# Patient Record
Sex: FEMALE | Race: WHITE | NOT HISPANIC OR LATINO | Employment: FULL TIME | URBAN - METROPOLITAN AREA
[De-identification: names, ages, dates, MRNs, and addresses within clinical notes are randomized per-mention and may not be internally consistent; named-entity substitution may affect disease eponyms.]

---

## 2017-01-21 ENCOUNTER — HOSPITAL ENCOUNTER (OUTPATIENT)
Dept: MAMMOGRAPHY | Facility: CLINIC | Age: 52
Discharge: HOME | End: 2017-01-21
Payer: MEDICARE

## 2017-01-21 DIAGNOSIS — Z12.31 ENCOUNTER FOR SCREENING MAMMOGRAM FOR MALIGNANT NEOPLASM OF BREAST: ICD-10-CM

## 2017-01-21 PROCEDURE — 77063 BREAST TOMOSYNTHESIS BI: CPT

## 2017-01-21 PROCEDURE — G0202 SCR MAMMO BI INCL CAD: HCPCS

## 2017-02-28 ENCOUNTER — LAB CONVERSION - ENCOUNTER (OUTPATIENT)
Dept: OTHER | Facility: OTHER | Age: 52
End: 2017-02-28

## 2017-02-28 ENCOUNTER — GENERIC CONVERSION - ENCOUNTER (OUTPATIENT)
Dept: OTHER | Facility: OTHER | Age: 52
End: 2017-02-28

## 2017-02-28 LAB
DEPRECATED RDW RBC AUTO: 13.2 % (ref 11–15)
HCT VFR BLD AUTO: 35.6 % (ref 35–45)
HGB BLD-MCNC: 11.7 G/DL (ref 11.7–15.5)
MCH RBC QN AUTO: 29.9 PG (ref 27–33)
MCHC RBC AUTO-ENTMCNC: 32.8 G/DL (ref 32–36)
MCV RBC AUTO: 91.1 FL (ref 80–100)
PLATELET # BLD AUTO: 222 THOUSAND/UL (ref 140–400)
PMV BLD AUTO: 9.4 FL (ref 7.5–12.5)
RBC # BLD AUTO: 3.91 MILLION/UL (ref 3.8–5.1)
TSH SERPL DL<=0.05 MIU/L-ACNC: 4.33 MIU/L
WBC # BLD AUTO: 6.2 THOUSAND/UL (ref 3.8–10.8)

## 2017-05-30 ENCOUNTER — ALLSCRIPTS OFFICE VISIT (OUTPATIENT)
Dept: OTHER | Facility: OTHER | Age: 52
End: 2017-05-30

## 2017-12-15 ENCOUNTER — GENERIC CONVERSION - ENCOUNTER (OUTPATIENT)
Dept: OTHER | Facility: OTHER | Age: 52
End: 2017-12-15

## 2017-12-18 ENCOUNTER — TRANSCRIBE ORDERS (OUTPATIENT)
Dept: ADMINISTRATIVE | Facility: HOSPITAL | Age: 52
End: 2017-12-18

## 2017-12-18 DIAGNOSIS — Z13.820 ENCOUNTER FOR SCREENING FOR OSTEOPOROSIS: Primary | ICD-10-CM

## 2017-12-29 ENCOUNTER — HOSPITAL ENCOUNTER (OUTPATIENT)
Dept: BONE DENSITY | Facility: IMAGING CENTER | Age: 52
Discharge: HOME | End: 2017-12-29
Payer: MEDICARE

## 2017-12-29 ENCOUNTER — GENERIC CONVERSION - ENCOUNTER (OUTPATIENT)
Dept: FAMILY MEDICINE CLINIC | Facility: HOSPITAL | Age: 52
End: 2017-12-29

## 2017-12-29 DIAGNOSIS — Z13.820 ENCOUNTER FOR SCREENING FOR OSTEOPOROSIS: ICD-10-CM

## 2017-12-29 PROCEDURE — 77080 DXA BONE DENSITY AXIAL: CPT

## 2018-01-02 ENCOUNTER — GENERIC CONVERSION - ENCOUNTER (OUTPATIENT)
Dept: OTHER | Facility: OTHER | Age: 53
End: 2018-01-02

## 2018-01-12 NOTE — RESULT NOTES
Verified Results  (1) LIPID PANEL, FASTING [id number] 02:03PM [facility]     Test Name Result Flag Reference   CHOLESTEROL, TOTAL 224 mg/dL H 125-200   HDL CHOLESTEROL 51 mg/dL  > OR = 46   TRIGLICERIDES 874 mg/dL  <150   LDL-CHOLESTEROL 151 mg/dL (calc) H <130   Desirable range <100 mg/dL for patients with CHD or  diabetes and <70 mg/dL for diabetic patients with  known heart disease  CHOL/HDLC RATIO 4 4 (calc)  < OR = 5 0   NON HDL CHOLESTEROL 173 mg/dL (calc) H    Target for non-HDL cholesterol is 30 mg/dL higher than   LDL cholesterol target  (1) COMPREHENSIVE METABOLIC PANEL [id number] 80:76OV [facility]     Test Name Result Flag Reference   GLUCOSE 89 mg/dL  65-99   Fasting reference interval   UREA NITROGEN (BUN) 19 mg/dL  7-25   CREATININE 1 03 mg/dL  0 50-1 10   eGFR NON-AFR   AMERICAN 64 mL/min/1 73m2  > OR = 60   eGFR AFRICAN AMERICAN 74 mL/min/1 73m2  > OR = 60   BUN/CREATININE RATIO   8-75   NOT APPLICABLE (calc)   ALT 8 U/L  6-29   ALBUMIN 4 2 g/dL  3 6-5 1   GLOBULIN 2 8 g/dL (calc)  1 9-3 7   ALBUMIN/GLOBULIN RATIO 1 5 (calc)  1 0-2 5   BILIRUBIN, TOTAL 0 3 mg/dL  0 2-1 2   ALKALINE PHOSPHATASE 70 U/L     AST 15 U/L  10-35   SODIUM 136 mmol/L  135-146   POTASSIUM 4 2 mmol/L  3 5-5 3   CHLORIDE 101 mmol/L     CARBON DIOXIDE 27 mmol/L  20-31   CALCIUM 9 4 mg/dL  8 6-10 2   PROTEIN, TOTAL 7 0 g/dL  6 1-8 1     (1) T4, FREE [id number] 02:03PM [facility]     Test Name Result Flag Reference   T4, FREE 1 0 ng/dL  0 8-1 8     (Q) CBC (H/H, RBC, INDICES, WBC, PLT) [id number] 02:03PM [facility]     Test Name Result Flag Reference   WHITE BLOOD CELL COUNT 6 2 Thousand/uL  3 8-10 8   RED BLOOD CELL COUNT 3 91 Million/uL  3 80-5 10   HEMOGLOBIN 11 7 g/dL  11 7-15 5   HEMATOCRIT 35 6 %  35 0-45 0   MCV 91 1 fL  80 0-100 0   MCH 29 9 pg  27 0-33 0   MCHC 32 8 g/dL  32 0-36 0   RDW 13 2 %  11 0-15 0   PLATELET COUNT 893 Thousand/uL  140-400   MPV 9 4 fL  7 5-12 5     (Q) TSH, 3RD GENERATION [id number] 02:03PM [facility]   REPORT COMMENT:  FASTING:YES     Test Name Result Flag Reference   TSH 4 33 mIU/L     Reference Range                         > or = 20 Years  0 40-4 50                              Pregnancy Ranges            First trimester    0 26-2 66            Second trimester   0 55-2 73            Third trimester    0 43-2 91       Discussion/Summary    please notify pt that her BW was all normal except her cholesterol was still elevated - is she taking the Atorvastatin(Lipitor) 20 mg 1 tb PO q day? If she is we need to increase this to 40 mg q day - let me know and I will send new rx to pharmacy; sugar/thryoid/kidney/liver/blood count were all nml    spoke to pt states she was off cholesterol med for about 2 weeks due to loss of insurance  Now has insurance and recently restarted med

## 2018-01-14 VITALS
TEMPERATURE: 97.7 F | HEART RATE: 68 BPM | HEIGHT: 64 IN | BODY MASS INDEX: 25.95 KG/M2 | SYSTOLIC BLOOD PRESSURE: 118 MMHG | WEIGHT: 152 LBS | DIASTOLIC BLOOD PRESSURE: 82 MMHG

## 2018-01-23 NOTE — RESULT NOTES
Discussion/Summary    please notify pt that her bone density test con't to show Osteopenia - not nml but not severe enough to be Osteoporosis - unchanged from last study - con't Calcium/Vit d supplement and increase calcium in diet (cheese/yogurt/milk) and increase wgt bearing exercise; will d/w pt in detail at next appt  Patient aware         Verified Results  * DXA BONE DENSITY SPINE HIP AND PELVIS [id number] 02:32PM [facility]     Test Name Result Flag Reference   DXA BONE DENSITY SPINE HIP AND PELVIS (Report)     DXA SCAN     CLINICAL HISTORY: 30-year-old woman  Menopause at age 42  OTHER RISK FACTORS: History of fracture following a low level trauma  TECHNIQUE: Bone densitometry was performed using a Hologic Horizon A bone densitometer  Regions of interest appear properly placed  COMPARISON: September 14, 2015  RESULTS:      LUMBAR SPINE L1-L4: BMD 0 810 gm/cm2 / T-score -0 2 / Z score -1 4         LEFT TOTAL HIP: BMD 0 771 gm/cm2 / T-score -1 4 / Z score -0 9   LEFT FEMORAL NECK: BMD 0 713 gm/cm2 / T score -1 2 / Z score -0 5       CHANGE: Since the last DXA:   LUMBAR SPINE BMD has decreased 0 009 gm/cm2 or 1 1%  This change is not statistically significant  HIP BMD has increased 0 004 gm/cm2 or 0 5%  This change is not statistically significant  IMPRESSION:     1  Low bone mass (osteopenia)  2  Since a DXA study from 9/14/2015, there has been no statistically significant change in bone mineral density  2  The 10 year risk of hip fracture is 0 6% with the 10 year risk of major osteoporotic fracture being 7 8% as calculated by the [facility]/WHO fracture risk assessment tool (FRAX)        3  The current NOF guidelines recommend treating patients with a T-score of -2 5 or less in the lumbar spine or hips, or in post-menopausal women and men over the age of 48 with low bone mass (osteopenia) and a FRAX 10 year risk score of >3% for hip    fracture and/or >20% for major osteoporotic fracture  4  A daily intake of at least 1200 mg calcium and vitamin D 800- 1000 IU, as well as weight bearing and muscle strengthening exercise, fall prevention and avoidance of tobacco and excessive alcohol as preventive measurements are suggested  5  Follow-up DXA in two years is recommended for most patients  A one year follow-up DXA is recommended after initiation or change in therapy for osteoporosis  More frequent evaluation is also appropriate for patients with conditions associated with    rapid bone loss, such as glucocorticoid therapy  The FRAX tool has not been validated in patients currently or previously treated with pharmacotherapy for osteoporosis  In such patients, clinical judgment must be exercised in interpreting FRAX scores  It is not appropriate to use FRAX to monitor    treatment response         WHO CLASSIFICATION:   Normal (a T-score of -1 0 or higher)   Low bone mineral density (a T-score of less than -1 0 but higher than -2 5)   Osteoporosis (a T-score of -2 5 or less)   Severe osteoporosis (a T-score of -2 5 or less with a fragility fracture)     Least significant change (lumbar spine): 0 025 g/cm2; 2 8%   Least significant change (total hip): 0 025 g/cm2; 3 7%   Least significant change (forearm): 0 012 g/cm2; 1 9%       Workstation performed: [id number]     Signed by:   Bryan Fulton MD   1/2/18

## 2018-01-24 VITALS
HEIGHT: 64 IN | WEIGHT: 157 LBS | SYSTOLIC BLOOD PRESSURE: 110 MMHG | TEMPERATURE: 97 F | HEART RATE: 78 BPM | BODY MASS INDEX: 26.8 KG/M2 | DIASTOLIC BLOOD PRESSURE: 78 MMHG

## 2018-02-01 DIAGNOSIS — G89.4 CHRONIC PAIN SYNDROME: Primary | ICD-10-CM

## 2018-02-01 RX ORDER — FENTANYL 25 UG/H
1 PATCH TRANSDERMAL
Qty: 10 PATCH | Refills: 0 | Status: SHIPPED | OUTPATIENT
Start: 2018-02-01 | End: 2018-06-05 | Stop reason: SDUPTHER

## 2018-02-01 RX ORDER — FENTANYL 25 UG/H
PATCH TRANSDERMAL
COMMUNITY
Start: 2012-08-21 | End: 2018-02-01 | Stop reason: SDUPTHER

## 2018-02-14 ENCOUNTER — TELEPHONE (OUTPATIENT)
Dept: FAMILY MEDICINE CLINIC | Facility: HOSPITAL | Age: 53
End: 2018-02-14

## 2018-06-05 DIAGNOSIS — G89.4 CHRONIC PAIN SYNDROME: ICD-10-CM

## 2018-06-05 RX ORDER — FENTANYL 25 UG/H
1 PATCH TRANSDERMAL
Qty: 10 PATCH | Refills: 0 | Status: SHIPPED | OUTPATIENT
Start: 2018-06-05 | End: 2018-08-10 | Stop reason: SDUPTHER

## 2018-08-10 DIAGNOSIS — G89.4 CHRONIC PAIN SYNDROME: ICD-10-CM

## 2018-08-10 RX ORDER — FENTANYL 25 UG/H
1 PATCH TRANSDERMAL
Qty: 10 PATCH | Refills: 0 | Status: SHIPPED | OUTPATIENT
Start: 2018-08-10 | End: 2018-10-12 | Stop reason: SDUPTHER

## 2018-10-12 DIAGNOSIS — G89.4 CHRONIC PAIN SYNDROME: ICD-10-CM

## 2018-10-12 RX ORDER — FENTANYL 25 UG/H
1 PATCH TRANSDERMAL
Qty: 10 PATCH | Refills: 0 | Status: SHIPPED | OUTPATIENT
Start: 2018-10-12 | End: 2018-11-08 | Stop reason: SDUPTHER

## 2018-11-06 ENCOUNTER — OFFICE VISIT (OUTPATIENT)
Dept: OBGYN CLINIC | Facility: HOSPITAL | Age: 53
End: 2018-11-06
Payer: COMMERCIAL

## 2018-11-06 VITALS
HEART RATE: 80 BPM | DIASTOLIC BLOOD PRESSURE: 68 MMHG | SYSTOLIC BLOOD PRESSURE: 107 MMHG | WEIGHT: 141 LBS | HEIGHT: 61 IN | BODY MASS INDEX: 26.62 KG/M2

## 2018-11-06 DIAGNOSIS — Z12.31 SCREENING MAMMOGRAM, ENCOUNTER FOR: Primary | ICD-10-CM

## 2018-11-06 PROCEDURE — 99386 PREV VISIT NEW AGE 40-64: CPT | Performed by: OBSTETRICS & GYNECOLOGY

## 2018-11-07 PROBLEM — Z01.419 ENCOUNTER FOR WELL WOMAN EXAM: Status: ACTIVE | Noted: 2018-11-07

## 2018-11-07 PROBLEM — Z85.41 HISTORY OF CERVICAL CANCER: Status: ACTIVE | Noted: 2018-01-10

## 2018-11-07 NOTE — PROGRESS NOTES
Subjective      Kristin Andrade is a 50 y o  female who presents for annual well woman exam        GYN:  · S/P hysterectomy, bilateral salpingoophorectomy in September 2010 at [facility] secondary to cervical cancer, s/p radiation treatment  Has not followed with GYN oncology in years  Seen at [facility] 1/2018 for annual exam, Pap and HPV co-testing negative though patient denies encounter ever occurred  · Raises concern over new onset fatigue and feelings of hopelessness that have been an issue for the past 6 weeks  · Chronic pain patient, uses daily fentanyl patches prescribed by PCP  Reports worsening bilateral upper and lower extremity pain associated with fatigue and feelings of hopelessness  Voices concern over recurrence of cancer given new symptoms  Denies vaginal bleeding, vagina discharge, vaginal pressure, vagina bulging, abdominal pain, fevers, chills  :  · S/p repair vesicovaginal fistula repair 2012, denies feeling sensation to void but undergoes time voids every 2 hours  Reports single monthly episode of nightly enuresis  Breast:  · Denies breast mass, skin changes, dimpling, reddening, nipple retraction  · Denies breast discharge  · Last mammogram was 2015, BI RADS 1  General:  · Diet: non-descript  · Exercise: occasional walking  · Work: behavioral health specialist  · ETOH use: denies  · Tobacco use: denies   · Recreational drug use: denies     Screening:  · Not sexually active  Declines STD screening    Review of Systems  Review of Systems   Constitutional: Positive for appetite change and fatigue  Negative for chills, diaphoresis and fever  HENT: Positive for sinus pressure  Negative for congestion, dental problem and sore throat  Respiratory: Negative for cough, chest tightness and shortness of breath  Cardiovascular: Negative for chest pain, palpitations and leg swelling  Gastrointestinal: Negative for abdominal pain, constipation, diarrhea, nausea and vomiting  Genitourinary: Negative for dysuria, hematuria, vaginal bleeding, vaginal discharge and vaginal pain  Musculoskeletal: Positive for arthralgias and myalgias  Negative for back pain and neck pain  Skin: Negative for color change, pallor and rash  Neurological: Negative for dizziness, weakness, light-headedness and headaches  Psychiatric/Behavioral: Positive for dysphoric mood  Negative for agitation and self-injury  The patient is not nervous/anxious  Objective      /68   Pulse 80   Ht 5' 1" (1 549 m)   Wt 64 kg (141 lb)   BMI 26 64 kg/m²     Physical Exam   Constitutional: She is oriented to person, place, and time  She appears well-developed and well-nourished  Cardiovascular: Normal rate, regular rhythm, normal heart sounds and intact distal pulses  Pulmonary/Chest: Effort normal and breath sounds normal  No respiratory distress  She has no wheezes  Right breast exhibits no inverted nipple, no mass, no nipple discharge, no skin change and no tenderness  Left breast exhibits no inverted nipple, no mass, no nipple discharge, no skin change and no tenderness  Abdominal: Soft  Bowel sounds are normal  She exhibits no distension  There is no tenderness  Genitourinary: There is no rash, tenderness, lesion or injury on the right labia  There is no rash, tenderness, lesion or injury on the left labia  Right adnexum displays no mass, no tenderness and no fullness  Left adnexum displays no mass, no tenderness and no fullness  No tenderness in the vagina  No vaginal discharge found  Genitourinary Comments: Cervix, uterus, ovaries surgically absent    Atrophic vagina with stenosis reflecting radiation treatment  Able to introduce small white speculum approximately 2 inches into introitus  Neurological: She is alert and oriented to person, place, and time  Skin: She is not diaphoretic              Assessment/Plan     Encounter for well woman exam  As Pap and co-testing WNL 1/2018, Pap smear not indicated today  Mammogram ordered  Encouraged patient to follow-up with pain specialist regarding chronic pain issues and appropriate medication dosing  Reviewed findings of pelvic exam without concern for cancer recurrence  Encourage follow-up with [facility] gynecology oncology for plan for long term follow-up       Sonia Matthew DO

## 2018-11-07 NOTE — PATIENT INSTRUCTIONS

## 2018-11-07 NOTE — ASSESSMENT & PLAN NOTE
As Pap and co-testing WNL 1/2018, Pap smear not indicated today  Mammogram ordered  Encouraged patient to follow-up with pain specialist regarding chronic pain issues and appropriate medication dosing  Reviewed findings of pelvic exam without concern for cancer recurrence  Encourage follow-up with [facility] gynecology oncology for plan for long term follow-up

## 2018-11-08 DIAGNOSIS — G89.4 CHRONIC PAIN SYNDROME: ICD-10-CM

## 2018-11-08 RX ORDER — FENTANYL 25 UG/H
1 PATCH TRANSDERMAL
Qty: 10 PATCH | Refills: 0 | Status: SHIPPED | OUTPATIENT
Start: 2018-11-11 | End: 2018-12-21 | Stop reason: SDUPTHER

## 2018-11-20 ENCOUNTER — TELEPHONE (OUTPATIENT)
Dept: OBGYN CLINIC | Facility: HOSPITAL | Age: 53
End: 2018-11-20

## 2018-11-20 NOTE — TELEPHONE ENCOUNTER
I called pt to give her reminder to complete her mammogram that was ordered  Pt says she doesn't currently have health insurance  I explained to pt that it appears that she was entered into our Healthy Women Program  I later left pt VM giving her the phone number for the [employer] so she can verify that she qualified for the Program, and then when she verifies this, she can call [facility] Scheduling to schedule the mammogram (phone number provided)  Our office number provided in case pt has any questions

## 2018-12-21 DIAGNOSIS — G89.4 CHRONIC PAIN SYNDROME: ICD-10-CM

## 2018-12-21 RX ORDER — FENTANYL 25 UG/H
1 PATCH TRANSDERMAL
Qty: 10 PATCH | Refills: 0 | Status: SHIPPED | OUTPATIENT
Start: 2018-12-21 | End: 2019-02-09 | Stop reason: SDUPTHER

## 2019-02-09 DIAGNOSIS — G89.4 CHRONIC PAIN SYNDROME: ICD-10-CM

## 2019-02-10 RX ORDER — FENTANYL 25 UG/H
1 PATCH TRANSDERMAL
Qty: 10 PATCH | Refills: 0 | Status: SHIPPED | OUTPATIENT
Start: 2019-02-10 | End: 2019-03-04 | Stop reason: SDUPTHER

## 2019-02-11 NOTE — TELEPHONE ENCOUNTER
Please notify pt that she has not been seen in over a year - no further meds until seen after this rx  - please schedule appt

## 2019-02-18 ENCOUNTER — TELEPHONE (OUTPATIENT)
Dept: OBGYN CLINIC | Facility: CLINIC | Age: 54
End: 2019-02-18

## 2019-02-18 ENCOUNTER — OFFICE VISIT (OUTPATIENT)
Dept: FAMILY MEDICINE CLINIC | Facility: HOSPITAL | Age: 54
End: 2019-02-18
Payer: COMMERCIAL

## 2019-02-18 VITALS
HEART RATE: 76 BPM | BODY MASS INDEX: 26.62 KG/M2 | HEIGHT: 61 IN | DIASTOLIC BLOOD PRESSURE: 70 MMHG | SYSTOLIC BLOOD PRESSURE: 110 MMHG | TEMPERATURE: 98 F | WEIGHT: 141 LBS

## 2019-02-18 DIAGNOSIS — Z85.41 HISTORY OF CERVICAL CANCER: ICD-10-CM

## 2019-02-18 DIAGNOSIS — M79.7 FIBROMYALGIA: ICD-10-CM

## 2019-02-18 DIAGNOSIS — R63.4 WEIGHT LOSS, UNINTENTIONAL: ICD-10-CM

## 2019-02-18 DIAGNOSIS — Z12.39 SCREENING FOR MALIGNANT NEOPLASM OF BREAST: Primary | ICD-10-CM

## 2019-02-18 DIAGNOSIS — E03.9 HYPOTHYROIDISM, UNSPECIFIED TYPE: ICD-10-CM

## 2019-02-18 PROBLEM — Z01.419 ENCOUNTER FOR WELL WOMAN EXAM: Status: RESOLVED | Noted: 2018-11-07 | Resolved: 2019-02-18

## 2019-02-18 PROCEDURE — 99214 OFFICE O/P EST MOD 30 MIN: CPT | Performed by: INTERNAL MEDICINE

## 2019-02-18 RX ORDER — CALCIUM CARBONATE 300MG(750)
1 TABLET,CHEWABLE ORAL DAILY
COMMUNITY
Start: 2015-02-23

## 2019-02-18 RX ORDER — ATORVASTATIN CALCIUM 20 MG/1
1 TABLET, FILM COATED ORAL DAILY
COMMUNITY
Start: 2015-11-17

## 2019-02-18 RX ORDER — IBUPROFEN 200 MG
1 CAPSULE ORAL 2 TIMES DAILY
COMMUNITY
Start: 2015-02-23

## 2019-02-18 RX ORDER — LEVOTHYROXINE SODIUM 0.05 MG/1
1 TABLET ORAL DAILY
COMMUNITY
Start: 2012-06-19 | End: 2020-04-07 | Stop reason: SDUPTHER

## 2019-02-18 RX ORDER — LORAZEPAM 1 MG/1
1 TABLET ORAL DAILY PRN
COMMUNITY
Start: 2012-08-21 | End: 2020-02-07 | Stop reason: SDUPTHER

## 2019-02-18 RX ORDER — PREGABALIN 75 MG/1
1 CAPSULE ORAL 2 TIMES DAILY
COMMUNITY
Start: 2017-12-15 | End: 2019-02-18

## 2019-02-18 NOTE — ASSESSMENT & PLAN NOTE
Pain still up and down - stopped Lyrica d/t sedation, taking Fentanyl patch as directed, will defer further med changes at this time d/t limited financial means - call if this changes or new/worse symptoms occur

## 2019-02-18 NOTE — TELEPHONE ENCOUNTER
Left voicemail explaining:  Please call [facility]'s central scheduling at [phone] to schedule your mammogram  The order is already placed in EPIC, your medical record  Any questions feel free to use [employer] or call [phone], [facility]

## 2019-02-18 NOTE — PROGRESS NOTES
Assessment/Plan:    Fibromyalgia  Pain still up and down - stopped Lyrica d/t sedation, taking Fentanyl patch as directed, will defer further med changes at this time d/t limited financial means - call if this changes or new/worse symptoms occur    Hypothyroidism  Wgt down 16 lbs in a little over a year - unintentional - due for TFT's - order given, urged to take med alone - 1 hr before/after eating and 2 hrs before any other  meds/supplements    History of cervical cancer  Just saw GYN - no evidence of reoccurrence of cervical cancer, concerned with wgt loss but d/w pt that can be d/t other reasons as well       Diagnoses and all orders for this visit:    Screening for malignant neoplasm of breast  -     Mammo diagnostic bilateral w cad; Future    Fibromyalgia  -     CBC and differential  -     Comprehensive metabolic panel    Hypothyroidism, unspecified type  -     CBC and differential  -     Comprehensive metabolic panel  -     TSH, 3rd generation; Future  -     T4, free; Future    Weight loss, unintentional  Comments:  D/w pt that this can be d/t cessation of Lyrica and/or Abilify as both can results in wgt gain, can be d/t thyroid being off - orders for TFT's given, obviously she is worried about reoccurence of cancer and recommended cancer screenings were reviewed - she had GYN exam and has mammo scheduled, she is still in need for colo but will defer d/t financial restrictions/no insurance, will check basic minimal labs, prefer pt back in 3 mos for wgt check but d/t no insurance will defer to 6 mo - urged to check wgt at home and call with continued unintentional wgt loss - pt verbally agreed to do so, no red flag GI symptoms present - reviewed with pt and urged to call if they occur  Orders:  -     CBC and differential  -     Comprehensive metabolic panel    History of cervical cancer    Other orders  -     Discontinue: pregabalin (LYRICA) 75 mg capsule;  Take 1 capsule by mouth 2 (two) times a day  - LORazepam (ATIVAN) 1 mg tablet; Take 1 tablet by mouth daily as needed for anxiety  -     levothyroxine 50 mcg tablet; Take 1 tablet by mouth daily  -     atorvastatin (LIPITOR) 20 mg tablet; Take 1 tablet by mouth daily  -     Calcium 600 MG tablet; Take 1 tablet by mouth 2 (two) times a day  -     Cholecalciferol (VITAMIN D3) 1000 units CHEW; Chew 1 tablet daily      BW  - last FLP was elevated at that time, she is taking her statin daily    [place] - no baseline in chart    Mammo  - ordered by Dr Jose Zelaya in chart    Dexa  - osteopenia    PAP - s/p hysterectomy    Subjective:      Patient ID: Meryle Kanaris is a 48 y o  female  HPI Pt here for follow up appt     Pt not seen since  and was told she had to be seen to con't receiving med refills  She has no insurance currently and is limited on what she can do financially  Her mother  in Aug and she was helping out with the rent and since then pt has not been able able to afford insurance and has not been in for regular visits  Pt con't to use her Fentanyl patch every 3 days  She states it helps her pain that is "all over"  She uses Ibuprofen as needed as well  She stopped the Lyrica as it made her sleepy/sedated  She is taking her levothyroxine 50 mcg every pm by itself  She has had unintentional wgt loss - 16 lbs since 2017  She denies any dysphagia/pain with swallowing/abd pain/N/V/changes in stool/blood in stool/black stool  BW  - last FLP was elevated at that time, she is taking her statin daily    [place] - no baseline in chart    Mammo  - ordered by Dr Jose Zelaya in chart    Dexa  - osteopenia    PAP - s/p hysterectomy    Review of Systems   Constitutional: Positive for unexpected weight change  Negative for chills and fever  HENT: Negative for congestion and sore throat  Eyes: Negative for pain and visual disturbance  Respiratory: Negative for cough, shortness of breath and wheezing      Cardiovascular: Negative for chest pain, palpitations and leg swelling  Gastrointestinal: Negative for abdominal pain, blood in stool, constipation, diarrhea, nausea and vomiting  Endocrine: Negative for polydipsia and polyuria  Genitourinary: Negative for difficulty urinating and dysuria  Musculoskeletal: Positive for arthralgias and myalgias  Negative for back pain and neck pain  Skin: Negative for rash and wound  Neurological: Negative for dizziness, syncope and headaches  Hematological: Negative for adenopathy  Psychiatric/Behavioral: Negative for behavioral problems, confusion and dysphoric mood  The patient is not nervous/anxious  Objective:    /70 (BP Location: Right arm, Patient Position: Sitting, Cuff Size: Standard)   Pulse 76   Temp 98 °F (36 7 °C)   Ht 5' 1" (1 549 m)   Wt 64 kg (141 lb)   BMI 26 64 kg/m²      Physical Exam   Constitutional: She appears well-developed and well-nourished  No distress  HENT:   Head: Normocephalic and atraumatic  Eyes: Conjunctivae are normal  Right eye exhibits no discharge  Left eye exhibits no discharge  Neck: Neck supple  No tracheal deviation present  Cardiovascular: Normal rate, regular rhythm and normal heart sounds  Exam reveals no friction rub  No murmur heard  Pulmonary/Chest: Effort normal and breath sounds normal  No respiratory distress  She has no wheezes  She has no rales  Abdominal: Soft  There is no tenderness  There is no rebound and no guarding  Musculoskeletal: She exhibits no edema  Neurological: She is alert  She exhibits normal muscle tone  Skin: Skin is warm  No rash noted  Psychiatric: She has a normal mood and affect  Her behavior is normal    Nursing note and vitals reviewed

## 2019-02-18 NOTE — ASSESSMENT & PLAN NOTE
Just saw GYN - no evidence of reoccurrence of cervical cancer, concerned with wgt loss but d/w pt that can be d/t other reasons as well

## 2019-02-18 NOTE — ASSESSMENT & PLAN NOTE
Wgt down 16 lbs in a little over a year - unintentional - due for TFT's - order given, urged to take med alone - 1 hr before/after eating and 2 hrs before any other  meds/supplements

## 2019-03-04 DIAGNOSIS — G89.4 CHRONIC PAIN SYNDROME: ICD-10-CM

## 2019-03-05 RX ORDER — FENTANYL 25 UG/H
1 PATCH TRANSDERMAL
Qty: 10 PATCH | Refills: 0 | Status: SHIPPED | OUTPATIENT
Start: 2019-03-05 | End: 2019-04-04 | Stop reason: SDUPTHER

## 2019-03-21 ENCOUNTER — APPOINTMENT (OUTPATIENT)
Dept: LAB | Facility: HOSPITAL | Age: 54
End: 2019-03-21
Payer: COMMERCIAL

## 2019-03-21 LAB
ALBUMIN SERPL BCP-MCNC: 3.4 G/DL (ref 3.5–5)
ALP SERPL-CCNC: 76 U/L (ref 46–116)
ALT SERPL W P-5'-P-CCNC: 14 U/L (ref 12–78)
ANION GAP SERPL CALCULATED.3IONS-SCNC: 8 MMOL/L (ref 4–13)
AST SERPL W P-5'-P-CCNC: 17 U/L (ref 5–45)
BASOPHILS # BLD AUTO: 0.09 THOUSANDS/ΜL (ref 0–0.1)
BASOPHILS NFR BLD AUTO: 1 % (ref 0–1)
BILIRUB SERPL-MCNC: 0.3 MG/DL (ref 0.2–1)
BUN SERPL-MCNC: 18 MG/DL (ref 5–25)
CALCIUM SERPL-MCNC: 9.2 MG/DL (ref 8.3–10.1)
CHLORIDE SERPL-SCNC: 104 MMOL/L (ref 100–108)
CO2 SERPL-SCNC: 28 MMOL/L (ref 21–32)
CREAT SERPL-MCNC: 1.07 MG/DL (ref 0.6–1.3)
EOSINOPHIL # BLD AUTO: 1.06 THOUSAND/ΜL (ref 0–0.61)
EOSINOPHIL NFR BLD AUTO: 17 % (ref 0–6)
ERYTHROCYTE [DISTWIDTH] IN BLOOD BY AUTOMATED COUNT: 12.3 % (ref 11.6–15.1)
GFR SERPL CREATININE-BSD FRML MDRD: 60 ML/MIN/1.73SQ M
GLUCOSE P FAST SERPL-MCNC: 78 MG/DL (ref 65–99)
HCT VFR BLD AUTO: 37.2 % (ref 34.8–46.1)
HGB BLD-MCNC: 12.2 G/DL (ref 11.5–15.4)
IMM GRANULOCYTES # BLD AUTO: 0.01 THOUSAND/UL (ref 0–0.2)
IMM GRANULOCYTES NFR BLD AUTO: 0 % (ref 0–2)
LYMPHOCYTES # BLD AUTO: 2.27 THOUSANDS/ΜL (ref 0.6–4.47)
LYMPHOCYTES NFR BLD AUTO: 35 % (ref 14–44)
MCH RBC QN AUTO: 31.4 PG (ref 26.8–34.3)
MCHC RBC AUTO-ENTMCNC: 32.8 G/DL (ref 31.4–37.4)
MCV RBC AUTO: 96 FL (ref 82–98)
MONOCYTES # BLD AUTO: 0.55 THOUSAND/ΜL (ref 0.17–1.22)
MONOCYTES NFR BLD AUTO: 9 % (ref 4–12)
NEUTROPHILS # BLD AUTO: 2.46 THOUSANDS/ΜL (ref 1.85–7.62)
NEUTS SEG NFR BLD AUTO: 38 % (ref 43–75)
NRBC BLD AUTO-RTO: 0 /100 WBCS
PLATELET # BLD AUTO: 205 THOUSANDS/UL (ref 149–390)
PMV BLD AUTO: 11 FL (ref 8.9–12.7)
POTASSIUM SERPL-SCNC: 3.9 MMOL/L (ref 3.5–5.3)
PROT SERPL-MCNC: 7.2 G/DL (ref 6.4–8.2)
RBC # BLD AUTO: 3.89 MILLION/UL (ref 3.81–5.12)
SODIUM SERPL-SCNC: 140 MMOL/L (ref 136–145)
WBC # BLD AUTO: 6.44 THOUSAND/UL (ref 4.31–10.16)

## 2019-03-21 PROCEDURE — 80053 COMPREHEN METABOLIC PANEL: CPT | Performed by: INTERNAL MEDICINE

## 2019-03-21 PROCEDURE — 85025 COMPLETE CBC W/AUTO DIFF WBC: CPT | Performed by: INTERNAL MEDICINE

## 2019-03-21 PROCEDURE — 36415 COLL VENOUS BLD VENIPUNCTURE: CPT | Performed by: INTERNAL MEDICINE

## 2019-03-28 ENCOUNTER — TELEPHONE (OUTPATIENT)
Dept: FAMILY MEDICINE CLINIC | Facility: HOSPITAL | Age: 54
End: 2019-03-28

## 2019-03-28 NOTE — TELEPHONE ENCOUNTER
PATIENT CALLED STATING THAT SHE IS NOT FEELING WELL - SHE HAD CANCER A FEW YEARS AGO AND IT MIGHT BE THAT AGAIN - SHE WOULD LIKE TO BE REFERRED TO AN ONCOLOGIST - PCB

## 2019-03-28 NOTE — TELEPHONE ENCOUNTER
Would recommend she come in for eval first as I can start some testing and determine if oncologist is needed

## 2019-03-29 ENCOUNTER — TELEPHONE (OUTPATIENT)
Dept: FAMILY MEDICINE CLINIC | Facility: HOSPITAL | Age: 54
End: 2019-03-29

## 2019-03-29 DIAGNOSIS — Z85.41 HISTORY OF CERVICAL CANCER: Primary | ICD-10-CM

## 2019-03-29 NOTE — TELEPHONE ENCOUNTER
Pt would like you to do a referral for a [employer]  Her other one was at [facility]  She is trying to get financial aid and needs a referral to get this  Please advise

## 2019-04-04 DIAGNOSIS — G89.4 CHRONIC PAIN SYNDROME: ICD-10-CM

## 2019-04-04 RX ORDER — FENTANYL 25 UG/H
1 PATCH TRANSDERMAL
Qty: 10 PATCH | Refills: 0 | Status: SHIPPED | OUTPATIENT
Start: 2019-04-04 | End: 2019-04-05 | Stop reason: SDUPTHER

## 2019-04-05 DIAGNOSIS — G89.4 CHRONIC PAIN SYNDROME: ICD-10-CM

## 2019-04-05 RX ORDER — FENTANYL 25 UG/H
1 PATCH TRANSDERMAL
Qty: 10 PATCH | Refills: 0 | Status: SHIPPED | OUTPATIENT
Start: 2019-04-05 | End: 2019-04-05 | Stop reason: SDUPTHER

## 2019-04-05 RX ORDER — FENTANYL 25 UG/H
1 PATCH TRANSDERMAL
Qty: 10 PATCH | Refills: 0 | Status: SHIPPED | OUTPATIENT
Start: 2019-04-05 | End: 2019-05-20 | Stop reason: SDUPTHER

## 2019-04-22 DIAGNOSIS — Z12.11 SCREENING FOR COLON CANCER: Primary | ICD-10-CM

## 2019-05-04 ENCOUNTER — CLINICAL SUPPORT (OUTPATIENT)
Dept: FAMILY MEDICINE CLINIC | Facility: HOSPITAL | Age: 54
End: 2019-05-04
Payer: COMMERCIAL

## 2019-05-04 DIAGNOSIS — Z11.1 ENCOUNTER FOR PPD TEST: Primary | ICD-10-CM

## 2019-05-04 PROCEDURE — 86580 TB INTRADERMAL TEST: CPT | Performed by: FAMILY MEDICINE

## 2019-05-06 ENCOUNTER — CLINICAL SUPPORT (OUTPATIENT)
Dept: FAMILY MEDICINE CLINIC | Facility: HOSPITAL | Age: 54
End: 2019-05-06

## 2019-05-06 DIAGNOSIS — Z11.1 ENCOUNTER FOR PPD SKIN TEST READING: Primary | ICD-10-CM

## 2019-05-06 LAB
INDURATION: 0 MM
TB SKIN TEST: NEGATIVE

## 2019-05-20 DIAGNOSIS — G89.4 CHRONIC PAIN SYNDROME: ICD-10-CM

## 2019-05-20 RX ORDER — FENTANYL 25 UG/H
1 PATCH TRANSDERMAL
Qty: 10 PATCH | Refills: 0 | Status: SHIPPED | OUTPATIENT
Start: 2019-05-20 | End: 2019-06-19 | Stop reason: SDUPTHER

## 2019-06-19 DIAGNOSIS — G89.4 CHRONIC PAIN SYNDROME: ICD-10-CM

## 2019-06-19 RX ORDER — FENTANYL 25 UG/H
1 PATCH TRANSDERMAL
Qty: 10 PATCH | Refills: 0 | Status: SHIPPED | OUTPATIENT
Start: 2019-06-19 | End: 2019-07-15 | Stop reason: SDUPTHER

## 2019-07-15 DIAGNOSIS — G89.4 CHRONIC PAIN SYNDROME: ICD-10-CM

## 2019-07-15 RX ORDER — FENTANYL 25 UG/H
1 PATCH TRANSDERMAL
Qty: 10 PATCH | Refills: 0 | Status: SHIPPED | OUTPATIENT
Start: 2019-07-15 | End: 2019-08-23 | Stop reason: SDUPTHER

## 2019-08-23 DIAGNOSIS — G89.4 CHRONIC PAIN SYNDROME: ICD-10-CM

## 2019-08-23 RX ORDER — FENTANYL 25 UG/H
1 PATCH TRANSDERMAL
Qty: 10 PATCH | Refills: 0 | Status: SHIPPED | OUTPATIENT
Start: 2019-08-23 | End: 2019-09-19 | Stop reason: SDUPTHER

## 2019-09-19 DIAGNOSIS — G89.4 CHRONIC PAIN SYNDROME: ICD-10-CM

## 2019-09-19 RX ORDER — FENTANYL 25 UG/H
1 PATCH TRANSDERMAL
Qty: 10 PATCH | Refills: 0 | Status: SHIPPED | OUTPATIENT
Start: 2019-09-19 | End: 2019-10-23 | Stop reason: SDUPTHER

## 2019-09-19 NOTE — TELEPHONE ENCOUNTER
Please notify pt that her Fentanyl patch was refilled but I will not refill the med again unless she is seen - has to be seen every 6 mos to con't receiving narcotic pain meds - please schedule appt

## 2019-10-10 ENCOUNTER — TELEPHONE (OUTPATIENT)
Dept: HEMATOLOGY ONCOLOGY | Facility: CLINIC | Age: 54
End: 2019-10-10

## 2019-10-10 ENCOUNTER — OFFICE VISIT (OUTPATIENT)
Dept: FAMILY MEDICINE CLINIC | Facility: HOSPITAL | Age: 54
End: 2019-10-10
Payer: MEDICARE

## 2019-10-10 VITALS
HEART RATE: 76 BPM | WEIGHT: 136 LBS | HEIGHT: 61 IN | SYSTOLIC BLOOD PRESSURE: 118 MMHG | TEMPERATURE: 97.8 F | BODY MASS INDEX: 25.68 KG/M2 | DIASTOLIC BLOOD PRESSURE: 70 MMHG

## 2019-10-10 DIAGNOSIS — M79.7 FIBROMYALGIA: ICD-10-CM

## 2019-10-10 DIAGNOSIS — G89.29 CHRONIC LOW BACK PAIN, UNSPECIFIED BACK PAIN LATERALITY, UNSPECIFIED WHETHER SCIATICA PRESENT: ICD-10-CM

## 2019-10-10 DIAGNOSIS — E78.2 MIXED HYPERLIPIDEMIA: ICD-10-CM

## 2019-10-10 DIAGNOSIS — Z11.59 NEED FOR HEPATITIS C SCREENING TEST: ICD-10-CM

## 2019-10-10 DIAGNOSIS — Z00.00 MEDICARE ANNUAL WELLNESS VISIT, SUBSEQUENT: ICD-10-CM

## 2019-10-10 DIAGNOSIS — M54.50 CHRONIC LOW BACK PAIN, UNSPECIFIED BACK PAIN LATERALITY, UNSPECIFIED WHETHER SCIATICA PRESENT: ICD-10-CM

## 2019-10-10 DIAGNOSIS — Z23 NEED FOR INFLUENZA VACCINATION: ICD-10-CM

## 2019-10-10 DIAGNOSIS — Z85.41 HISTORY OF CERVICAL CANCER: ICD-10-CM

## 2019-10-10 DIAGNOSIS — M85.80 OSTEOPENIA, UNSPECIFIED LOCATION: ICD-10-CM

## 2019-10-10 DIAGNOSIS — E66.3 OVERWEIGHT (BMI 25.0-29.9): ICD-10-CM

## 2019-10-10 DIAGNOSIS — Z12.31 ENCOUNTER FOR SCREENING MAMMOGRAM FOR BREAST CANCER: Primary | ICD-10-CM

## 2019-10-10 DIAGNOSIS — F32.9 REACTIVE DEPRESSION: ICD-10-CM

## 2019-10-10 PROCEDURE — 90471 IMMUNIZATION ADMIN: CPT | Performed by: INTERNAL MEDICINE

## 2019-10-10 PROCEDURE — 90682 RIV4 VACC RECOMBINANT DNA IM: CPT | Performed by: INTERNAL MEDICINE

## 2019-10-10 PROCEDURE — G0439 PPPS, SUBSEQ VISIT: HCPCS | Performed by: INTERNAL MEDICINE

## 2019-10-10 PROCEDURE — 99214 OFFICE O/P EST MOD 30 MIN: CPT | Performed by: INTERNAL MEDICINE

## 2019-10-10 RX ORDER — DULOXETIN HYDROCHLORIDE 20 MG/1
20 CAPSULE, DELAYED RELEASE ORAL DAILY
Qty: 30 CAPSULE | Refills: 1 | Status: SHIPPED | OUTPATIENT
Start: 2019-10-10 | End: 2019-11-09 | Stop reason: SDUPTHER

## 2019-10-10 NOTE — PROGRESS NOTES
Assessment/Plan:    Depression  Mood not controlled, start Cymbalta 20 mg 1 tab PO q day,  d/w pt that it takes 4-6 wks to get maximum benefit of med and that med has to be taken every day and to not miss doses of med, advised to take med daily and to call with SE/worse mood/SI, re-eval in 6wks      Fibromyalgia  Symptoms not controlled, start Cymbalta 20 mg 1 tab PO q day and re-eval in 6 wks, con't Fentanyl patch as directed    History of cervical cancer  Needs f/u with GYN Onc - number given and importance of f/u reviewed, currently w/o vaginal symptoms    Hyperlipidemia  Overdue for FLP - order given, diet/exercise/mild wgt loss encouraged    Lower back pain  Not at goal, start Cymbalta, con't Fentanyl patch, re-eval in 6 wks       Diagnoses and all orders for this visit:    Encounter for screening mammogram for breast cancer  -     Mammo screening bilateral w 3d & cad; Future    Osteopenia, unspecified location  -     DXA bone density spine hip and pelvis; Future    Chronic low back pain, unspecified back pain laterality, unspecified whether sciatica present  -     DULoxetine (CYMBALTA) 20 mg capsule; Take 1 capsule (20 mg total) by mouth daily  -     Comprehensive metabolic panel  -     TSH, 3rd generation with Free T4 reflex    Fibromyalgia  -     DULoxetine (CYMBALTA) 20 mg capsule; Take 1 capsule (20 mg total) by mouth daily  -     Comprehensive metabolic panel  -     TSH, 3rd generation with Free T4 reflex    Reactive depression  -     DULoxetine (CYMBALTA) 20 mg capsule; Take 1 capsule (20 mg total) by mouth daily  -     Comprehensive metabolic panel  -     TSH, 3rd generation with Free T4 reflex    Mixed hyperlipidemia  -     Lipid panel    History of cervical cancer  -     Ambulatory referral to Gynecologic Oncology; Future    Medicare annual wellness visit, subsequent    Need for hepatitis C screening test  -     Hepatitis C antibody; Future    Overweight (BMI 25 0-29  9)    BMI 25 0-25 9,adult Colonoscopy - no baseline yet, FIT cards have been given - urged to do    Mammo 1/17 - order given again today    Dexa 12/17 - osteopenia - order given for 12/19    BW 3/19 - order given to repeat    Pt is agreeable to flu vaccine - given today, will check with pharmacy about Shingrix    Subjective:      Patient ID: Laurent Borges is a 48 y o  female  HPI Pt here for follow up appt and AWV    She con't to use her Fentanyl patch as directed for fibromyalgia pain and LBP  She feels some benefit with the patch but does note they start to wear off and her pain is worse  She had tried Lyrica in the past but it made her too sleepy  She has not tried Cymbalta or Gabapentin to her knowledge  She notes her mood has been down recently d/t issues with her personal   She notes feeling down and sad but not really anxious or tense  She notes no SI  She does not have a good support system  She has not seen GYN or GYN Onc for f/u cervical cancer  She is requesting a referral  She notes no abd pain/N/V/vaginal bleeding or pain or discharge  Colonoscopy - no baseline yet, FIT cards have been given    Mammo 1/17 - order given again today    Dexa 12/17 - osteopenia    BW 3/19    Pt is agreeable to flu vaccine    Review of Systems   Constitutional: Negative for chills, fever and unexpected weight change  HENT: Negative for congestion and sore throat  Eyes: Negative for pain and visual disturbance  Respiratory: Negative for cough, shortness of breath and wheezing  Cardiovascular: Negative for chest pain, palpitations and leg swelling  Gastrointestinal: Negative for abdominal pain, blood in stool, constipation, diarrhea and nausea  Endocrine: Negative for polydipsia and polyuria  Genitourinary: Negative for difficulty urinating, dysuria, vaginal bleeding and vaginal pain  Musculoskeletal: Positive for arthralgias and back pain  Negative for neck pain  Skin: Negative for rash and wound  Neurological: Negative for dizziness, syncope, light-headedness and headaches  Hematological: Negative for adenopathy  Psychiatric/Behavioral: Positive for dysphoric mood and sleep disturbance  Negative for behavioral problems, confusion and suicidal ideas  The patient is not nervous/anxious            Objective:    /70 (BP Location: Right arm, Patient Position: Sitting, Cuff Size: Standard)   Pulse 76   Temp 97 8 °F (36 6 °C)   Ht 5' 1" (1 549 m)   Wt 61 7 kg (136 lb)   BMI 25 70 kg/m²      Physical Exam

## 2019-10-10 NOTE — TELEPHONE ENCOUNTER
New Patient Encounter    New Patient Intake Form   Patient Details:  Pepe Albrecht  1965  [id number]    Background Information:  [address] starts by opening a telephone encounter and gathering the following information   Who is calling to schedule? If not self, relationship to patient? self   Referring Provider Damaris Jeter   What is the diagnosis? Hx  Of cervical ca   When was the diagnosis? 2012   Is patient aware of diagnosis? Yes   Reason for visit? History Of   Have you had any testing done? If so: when, where? All was through [facility]  , in epic   Are records in [facility]? yes   Was the patient told to bring a disk? no   Scheduling Information:   Preferred [place]:  Any     Requesting Specific Provider? Titi Langston   Are there any dates/time the patient cannot be seen? no   Counseling Pre-Screen:  If the patient answers YES to any of the below questions, please route to the appropriate location specific counselor    Have you felt anxious or worried about cancer and the treatment you are receiving? Yes   Has your diagnosis caused physical, emotional, or financial hardship for you? Yes, at times   Note: Do not ask the patient about transportation issues/needs  Please notate if the patient brings it up and the counselor will schedule accordingly  Miscellaneous: Patient is ok to see Dr Tomer Corrigan   After completing the above information, please route to Financial Counselor and the appropriate Nurse Navigator for review

## 2019-10-10 NOTE — ASSESSMENT & PLAN NOTE
Symptoms not controlled, start Cymbalta 20 mg 1 tab PO q day and re-eval in 6 wks, con't Fentanyl patch as directed

## 2019-10-10 NOTE — ASSESSMENT & PLAN NOTE
Mood not controlled, start Cymbalta 20 mg 1 tab PO q day,  d/w pt that it takes 4-6 wks to get maximum benefit of med and that med has to be taken every day and to not miss doses of med, advised to take med daily and to call with SE/worse mood/SI, re-eval in 6wks

## 2019-10-10 NOTE — PROGRESS NOTES
Assessment and Plan:     Problem List Items Addressed This Visit        Musculoskeletal and Integument    Osteopenia    Relevant Orders    DXA bone density spine hip and pelvis       Other    Depression    Relevant Medications    DULoxetine (CYMBALTA) 20 mg capsule    Other Relevant Orders    Comprehensive metabolic panel    TSH, 3rd generation with Free T4 reflex    Fibromyalgia    Relevant Medications    DULoxetine (CYMBALTA) 20 mg capsule    Other Relevant Orders    Comprehensive metabolic panel    TSH, 3rd generation with Free T4 reflex    History of cervical cancer    Relevant Orders    Ambulatory referral to Gynecologic Oncology    Hyperlipidemia    Relevant Orders    Lipid panel    Lower back pain    Relevant Medications    DULoxetine (CYMBALTA) 20 mg capsule    Other Relevant Orders    Comprehensive metabolic panel    TSH, 3rd generation with Free T4 reflex      Other Visit Diagnoses     Encounter for screening mammogram for breast cancer    -  Primary    Relevant Orders    Mammo screening bilateral w 3d & cad    Medicare annual wellness visit, subsequent        Need for hepatitis C screening test        Relevant Orders    Hepatitis C antibody    Overweight (BMI 25 0-29  9)        BMI 25 0-25 9,adult            BMI Counseling: Body mass index is 25 7 kg/m²  The BMI is above normal  Nutrition recommendations include decreasing portion sizes, encouraging healthy choices of fruits and vegetables, consuming healthier snacks, limiting drinks that contain sugar, moderation in carbohydrate intake, increasing intake of lean protein, reducing intake of saturated and trans fat and reducing intake of cholesterol  Exercise recommendations include moderate physical activity 150 minutes/week and exercising 3-5 times per week  Preventive health issues were discussed with patient, and age appropriate screening tests were ordered as noted in patient's After Visit Summary    Personalized health advice and appropriate referrals for health education or preventive services given if needed, as noted in patient's After Visit Summary  History of Present Illness:     Patient presents for Welcome to Medicare visit  Patient Care Team:  Kinsey Oneal DO as PCP - General  Delmis Ortega MD     Review of Systems:     Review of Systems   Constitutional: Negative for chills, fatigue, fever and unexpected weight change  HENT: Negative for congestion and sore throat  Eyes: Negative for pain and visual disturbance  Respiratory: Negative for cough, shortness of breath and wheezing  Cardiovascular: Negative for chest pain, palpitations and leg swelling  Gastrointestinal: Negative for abdominal pain, blood in stool, constipation, diarrhea and nausea  Endocrine: Negative for polydipsia and polyuria  Genitourinary: Negative for difficulty urinating and dysuria  Musculoskeletal: Positive for arthralgias and back pain  Negative for neck pain  Skin: Negative for rash and wound  Neurological: Negative for dizziness, syncope, light-headedness and headaches  Hematological: Negative for adenopathy  Psychiatric/Behavioral: Positive for dysphoric mood and sleep disturbance  Negative for behavioral problems, confusion and suicidal ideas  The patient is not nervous/anxious         Problem List:     Patient Active Problem List   Diagnosis    Allergic rhinitis    Anemia    Anxiety disorder    Chronic interstitial cystitis    Depression    Fibromyalgia    Frequent nocturnal awakening    History of cervical cancer    Hyperlipidemia    Hypothyroidism    Lower back pain    Neurogenic bladder    Obstructive sleep apnea    Osteopenia    Periodic limb movement sleep disorder    Persistent hypersomnia    Snoring      Past Medical and Surgical History:     Past Medical History:   Diagnosis Date    Aftercare following surgery of the musculoskeletal system     Cervical cancer ([facility] [address] )     Closed fracture of left distal radius     Distal radial fracture     Frequent nocturnal awakening     History of dysuria     History of osteoporosis     Medicare annual wellness visit, subsequent     Multiple joint pain     Muscle ache     PPD screening test     Urethrovaginal fistula      Past Surgical History:   Procedure Laterality Date    NEPHRECTOMY Right     NEPHROSTOMY      with drainage    TONSILLECTOMY      TOTAL ABDOMINAL HYSTERECTOMY W/ BILATERAL SALPINGOOPHORECTOMY      VESICOVAGINAL FISTULA CLOSURE      repair      Family History:     Family History   Problem Relation Age of Onset    Adrenal disorder Mother         adrenal malignant neoplasm    Alcohol abuse Mother     Arthritis Mother     Depression Mother     Sleep apnea Mother     Leukemia Mother     Osteoporosis Mother     Alcohol abuse Father     Alzheimer's disease Father     Arthritis Father     Substance Abuse Father     Hypertension Father     Sleep apnea Sister     Heart attack Maternal Grandmother         myocardial infarction    Stroke Maternal Grandmother     Alcohol abuse Other         denied alcohol abuse    Cervical cancer Other         malignant neoplasm of cervix uteri    Other Family         dyslipidemia      Social History:     Social History     Socioeconomic History    Marital status:      Spouse name: None    Number of children: None    Years of education: None    Highest education level: None   Occupational History    None   Social Needs    Financial resource strain: None    Food insecurity:     Worry: None     Inability: None    Transportation needs:     Medical: None     Non-medical: None   Tobacco Use    Smoking status: Never Smoker    Smokeless tobacco: Former User   Substance and Sexual Activity    Alcohol use: No    Drug use: No    Sexual activity: Not Currently   Lifestyle    Physical activity:     Days per week: None     Minutes per session: None    Stress: None   Relationships    Social connections:     Talks on phone: None     Gets together: None     Attends Hindu service: None     Active member of club or organization: None     Attends meetings of clubs or organizations: None     Relationship status: None    Intimate partner violence:     Fear of current or ex partner: None     Emotionally abused: None     Physically abused: None     Forced sexual activity: None   Other Topics Concern    None   Social History Narrative    Full-time employment    Marital history-single    History of unemployed      Medications and Allergies:     Current Outpatient Medications   Medication Sig Dispense Refill    atorvastatin (LIPITOR) 20 mg tablet Take 1 tablet by mouth daily      Calcium 600 MG tablet Take 1 tablet by mouth 2 (two) times a day      Cholecalciferol (VITAMIN D3) 1000 units CHEW Chew 1 tablet daily      DULoxetine (CYMBALTA) 20 mg capsule Take 1 capsule (20 mg total) by mouth daily 30 capsule 1    fentaNYL (DURAGESIC) 25 mcg/hr Place 1 patch on the skin every third dayMax Daily Amount: 1 patch 10 patch 0    levothyroxine 50 mcg tablet Take 1 tablet by mouth daily      LORazepam (ATIVAN) 1 mg tablet Take 1 tablet by mouth daily as needed for anxiety       No current facility-administered medications for this visit        No Known Allergies   Immunizations:     Immunization History   Administered Date(s) Administered    INFLUENZA 10/28/2015, 09/25/2016, 11/14/2018    Influenza TIV (IM) 1965, 09/24/2012, 10/27/2015, 12/09/2017    Tuberculin Skin Test-PPD Intradermal 03/03/2016, 05/04/2019      Health Maintenance:         Topic Date Due    CRC Screening: Colonoscopy  1965    Cervical Cancer Screening  11/17/1988    MAMMOGRAM  01/21/2018         Topic Date Due    Pneumococcal Vaccine: Pediatrics (0 to 5 Years) and At-Risk Patients (6 to 59 Years) (1 of 3 - PCV13) 11/17/1973    INFLUENZA VACCINE  07/01/2019      Medicare Screening Tests and Risk Assessments:     Bill Lilly is here for her Subsequent Wellness visit  Last Medicare Wellness visit information reviewed, patient interviewed, no change since last AWV  Last Medicare Wellness visit information reviewed, patient interviewed and updates made to the record today  Health Risk Assessment:   Patient rates overall health as good  Patient feels that their physical health rating is slightly worse  Eyesight was rated as same  Hearing was rated as same  Patient feels that their emotional and mental health rating is slightly worse  Pain experienced in the last 7 days has been none  Patient states that she has experienced no weight loss or gain in last 6 months  Physical health and mental health worse d/t uncontrolled fibromyalgia pain and depression    Depression Screening:   PHQ-2 Score: 2  PHQ-9 Score: 10      Fall Risk Screening: In the past year, patient has experienced: no history of falling in past year      Urinary Incontinence Screening:   Patient has not leaked urine accidently in the last six months  Home Safety:  Patient does not have trouble with stairs inside or outside of their home  Patient has working smoke alarms and has no working carbon monoxide detector  Home safety hazards include: none  Nutrition:   Current diet is Regular and Limited junk food  Medications:   Patient is not currently taking any over-the-counter supplements  Patient is able to manage medications  Activities of Daily Living (ADLs)/Instrumental Activities of Daily Living (IADLs):   Walk and transfer into and out of bed and chair?: Yes  Dress and groom yourself?: Yes    Bathe or shower yourself?: Yes    Feed yourself?  Yes  Do your laundry/housekeeping?: Yes  Manage your money, pay your bills and track your expenses?: Yes  Make your own meals?: Yes    Do your own shopping?: Yes    Previous Hospitalizations:   Any hospitalizations or ED visits within the last 12 months?: No      Advance Care Planning:   Living will: No    Durable POA for healthcare: No    Advanced directive counseling given: No    Five wishes given: Yes    Patient declined ACP directive: No    End of Life Decisions reviewed with patient: No      Cognitive Screening:   Provider or family/friend/caregiver concerned regarding cognition?: No    PREVENTIVE SCREENINGS      Cardiovascular Screening:    General: History Lipid Disorder and Risks and Benefits Discussed      Diabetes Screening:     General: Risks and Benefits Discussed    Due for: Blood Glucose      Colorectal Cancer Screening:     General: Risks and Benefits Discussed    Due for: FOBT/FIT      Breast Cancer Screening:     General: Risks and Benefits Discussed    Due for: Mammogram        Cervical Cancer Screening:    General: Screening Not Indicated and History Cervical Cancer      Osteoporosis Screening:    General: Risks and Benefits Discussed    Due for: DXA Axial      Abdominal Aortic Aneurysm (AAA) Screening:        General: Risks and Benefits Discussed and Screening Not Indicated      Lung Cancer Screening:     General: Screening Not Indicated and Risks and Benefits Discussed      Hepatitis C Screening:    General: Risks and Benefits Discussed    Hep C Screening Accepted: Yes      Other Counseling Topics:   Car/seat belt/driving safety, sunscreen and regular weightbearing exercise  Visual Acuity Screening    Right eye Left eye Both eyes   Without correction:      With correction: 20/30 20/30 20/25        Physical Exam:     /70 (BP Location: Right arm, Patient Position: Sitting, Cuff Size: Standard)   Pulse 76   Temp 97 8 °F (36 6 °C)   Ht 5' 1" (1 549 m)   Wt 61 7 kg (136 lb)   BMI 25 70 kg/m²     Physical Exam   Constitutional: She appears well-developed and well-nourished  No distress  HENT:   Head: Normocephalic and atraumatic     Right Ear: External ear normal    Left Ear: External ear normal    Mouth/Throat: Oropharynx is clear and moist    Eyes: Conjunctivae are normal  Right eye exhibits no discharge  Left eye exhibits no discharge  Neck: Neck supple  No tracheal deviation present  No thyromegaly present  Cardiovascular: Normal rate, regular rhythm and normal heart sounds  Exam reveals no friction rub  No murmur heard  Pulmonary/Chest: Effort normal and breath sounds normal  No respiratory distress  She has no wheezes  She has no rales  Abdominal: Soft  She exhibits no distension  There is no tenderness  There is no guarding  Musculoskeletal: She exhibits no edema  Lymphadenopathy:     She has no cervical adenopathy  Neurological: She is alert  She exhibits normal muscle tone  Skin: Skin is warm and dry  No rash noted  Psychiatric: She has a normal mood and affect  Her behavior is normal    Nursing note and vitals reviewed        Mary Steele DO

## 2019-10-10 NOTE — PATIENT INSTRUCTIONS
Medicare Preventive Visit Patient Instructions  Thank you for completing your Welcome to Medicare Visit or Medicare Annual Wellness Visit today  Your next wellness visit will be due in one year (10/10/2020)  The screening/preventive services that you may require over the next 5-10 years are detailed below  Some tests may not apply to you based off risk factors and/or age  Screening tests ordered at today's visit but not completed yet may show as past due  Also, please note that scanned in results may not display below  Preventive Screenings:  Service Recommendations Previous Testing/Comments   Colorectal Cancer Screening  * Colonoscopy    * Fecal Occult Blood Test (FOBT)/Fecal Immunochemical Test (FIT)  * Fecal DNA/Cologuard Test  * Flexible Sigmoidoscopy Age: 54-65 years old   Colonoscopy: every 10 years (may be performed more frequently if at higher risk)  OR  FOBT/FIT: every 1 year  OR  Cologuard: every 3 years  OR  Sigmoidoscopy: every 5 years  Screening may be recommended earlier than age 48 if at higher risk for colorectal cancer  Also, an individualized decision between you and your healthcare provider will decide whether screening between the ages of 74-80 would be appropriate  Colonoscopy: Not on file  FOBT/FIT: Not on file  Cologuard: Not on file  Sigmoidoscopy: Not on file         Breast Cancer Screening Age: 36 years old  Frequency: every 1-2 years  Not required if history of left and right mastectomy Mammogram: 01/21/2017       Cervical Cancer Screening Between the ages of 21-29, pap smear recommended once every 3 years  Between the ages of 33-67, can perform pap smear with HPV co-testing every 5 years     Recommendations may differ for women with a history of total hysterectomy, cervical cancer, or abnormal pap smears in past  Pap Smear: Not on file    Screening Not Indicated  History Cervical Cancer   Hepatitis C Screening Once for adults born between [facility]  More frequently in patients at high risk for Hepatitis C Hep C Antibody: Not on file       Diabetes Screening 1-2 times per year if you're at risk for diabetes or have pre-diabetes Fasting glucose: 78 mg/dL   A1C: No results in last 5 years    Screening Current   Cholesterol Screening Once every 5 years if you don't have a lipid disorder  May order more often based on risk factors  Lipid panel: 02/27/2017    Screening Not Indicated  History Lipid Disorder     Other Preventive Screenings Covered by Medicare:  1  Abdominal Aortic Aneurysm (AAA) Screening: covered once if your at risk  You're considered to be at risk if you have a family history of AAA  2  Lung Cancer Screening: covers low dose CT scan once per year if you meet all of the following conditions: (1) Age 50-69; (2) No signs or symptoms of lung cancer; (3) Current smoker or have quit smoking within the last 15 years; (4) You have a tobacco smoking history of at least 30 pack years (packs per day multiplied by number of years you smoked); (5) You get a written order from a healthcare provider  3  Glaucoma Screening: covered annually if you're considered high risk: (1) You have diabetes OR (2) Family history of glaucoma OR (3)  aged 48 and older OR (3)  American aged 72 and older  3  Osteoporosis Screening: covered every 2 years if you meet one of the following conditions: (1) You're estrogen deficient and at risk for osteoporosis based off medical history and other findings; (2) Have a vertebral abnormality; (3) On glucocorticoid therapy for more than 3 months; (4) Have primary hyperparathyroidism; (5) On osteoporosis medications and need to assess response to drug therapy  · Last bone density test (DXA Scan): 12/29/2017  5  HIV Screening: covered annually if you're between the age of 12-76  Also covered annually if you are younger than 13 and older than 72 with risk factors for HIV infection   For pregnant patients, it is covered up to 3 times per pregnancy  Immunizations:  Immunization Recommendations   Influenza Vaccine Annual influenza vaccination during flu season is recommended for all persons aged >= 6 months who do not have contraindications   Pneumococcal Vaccine (Prevnar and Pneumovax)  * Prevnar = PCV13  * Pneumovax = PPSV23   Adults 25-60 years old: 1-3 doses may be recommended based on certain risk factors  Adults 72 years old: Prevnar (PCV13) vaccine recommended followed by Pneumovax (PPSV23) vaccine  If already received PPSV23 since turning 65, then PCV13 recommended at least one year after PPSV23 dose  Hepatitis B Vaccine 3 dose series if at intermediate or high risk (ex: diabetes, end stage renal disease, liver disease)   Tetanus (Td) Vaccine - COST NOT COVERED BY MEDICARE PART B Following completion of primary series, a booster dose should be given every 10 years to maintain immunity against tetanus  Td may also be given as tetanus wound prophylaxis  Tdap Vaccine - COST NOT COVERED BY MEDICARE PART B Recommended at least once for all adults  For pregnant patients, recommended with each pregnancy  Shingles Vaccine (Shingrix) - COST NOT COVERED BY MEDICARE PART B  2 shot series recommended in those aged 48 and above     Health Maintenance Due:      Topic Date Due    CRC Screening: Colonoscopy  1965    Cervical Cancer Screening  11/17/1988    MAMMOGRAM  01/21/2018     Immunizations Due:      Topic Date Due    Pneumococcal Vaccine: Pediatrics (0 to 5 Years) and At-Risk Patients (6 to 59 Years) (1 of 3 - PCV13) 11/17/1973    INFLUENZA VACCINE  07/01/2019     Advance Directives   What are advance directives? Advance directives are legal documents that state your wishes and plans for medical care  These plans are made ahead of time in case you lose your ability to make decisions for yourself  Advance directives can apply to any medical decision, such as the treatments you want, and if you want to donate organs     What are the types of advance directives? There are many types of advance directives, and each state has rules about how to use them  You may choose a combination of any of the following:  · Living will: This is a written record of the treatment you want  You can also choose which treatments you do not want, which to limit, and which to stop at a certain time  This includes surgery, medicine, IV fluid, and tube feedings  · Durable power of  for healthcare [facility]): This is a written record that states who you want to make healthcare choices for you when you are unable to make them for yourself  This person, called a proxy, is usually a family member or a friend  You may choose more than 1 proxy  · Do not resuscitate (DNR) order:  A DNR order is used in case your heart stops beating or you stop breathing  It is a request not to have certain forms of treatment, such as CPR  A DNR order may be included in other types of advance directives  · Medical directive: This covers the care that you want if you are in a coma, near death, or unable to make decisions for yourself  You can list the treatments you want for each condition  Treatment may include pain medicine, surgery, blood transfusions, dialysis, IV or tube feedings, and a ventilator (breathing machine)  · Values history: This document has questions about your views, beliefs, and how you feel and think about life  This information can help others choose the care that you would choose  Why are advance directives important? An advance directive helps you control your care  Although spoken wishes may be used, it is better to have your wishes written down  Spoken wishes can be misunderstood, or not followed  Treatments may be given even if you do not want them  An advance directive may make it easier for your family to make difficult choices about your care     Weight Management   Why it is important to manage your weight:  Being overweight increases your risk of health conditions such as heart disease, high blood pressure, type 2 diabetes, and certain types of cancer  It can also increase your risk for osteoarthritis, sleep apnea, and other respiratory problems  Aim for a slow, steady weight loss  Even a small amount of weight loss can lower your risk of health problems  How to lose weight safely:  A safe and healthy way to lose weight is to eat fewer calories and get regular exercise  You can lose up about 1 pound a week by decreasing the number of calories you eat by 500 calories each day  Healthy meal plan for weight management:  A healthy meal plan includes a variety of foods, contains fewer calories, and helps you stay healthy  A healthy meal plan includes the following:  · Eat whole-grain foods more often  A healthy meal plan should contain fiber  Fiber is the part of grains, fruits, and vegetables that is not broken down by your body  Whole-grain foods are healthy and provide extra fiber in your diet  Some examples of whole-grain foods are whole-wheat breads and pastas, oatmeal, brown rice, and bulgur  · Eat a variety of vegetables every day  Include dark, leafy greens such as spinach, kale, taryn greens, and mustard greens  Eat yellow and orange vegetables such as carrots, sweet potatoes, and winter squash  · Eat a variety of fruits every day  Choose fresh or canned fruit (canned in its own juice or light syrup) instead of juice  Fruit juice has very little or no fiber  · Eat low-fat dairy foods  Drink fat-free (skim) milk or 1% milk  Eat fat-free yogurt and low-fat cottage cheese  Try low-fat cheeses such as mozzarella and other reduced-fat cheeses  · Choose meat and other protein foods that are low in fat  Choose beans or other legumes such as split peas or lentils  Choose fish, skinless poultry (chicken or turkey), or lean cuts of red meat (beef or pork)  Before you cook meat or poultry, cut off any visible fat  · Use less fat and oil    Try baking foods instead of frying them  Add less fat, such as margarine, sour cream, regular salad dressing and mayonnaise to foods  Eat fewer high-fat foods  Some examples of high-fat foods include french fries, doughnuts, ice cream, and cakes  · Eat fewer sweets  Limit foods and drinks that are high in sugar  This includes candy, cookies, regular soda, and sweetened drinks  Exercise:  Exercise at least 30 minutes per day on most days of the week  Some examples of exercise include walking, biking, dancing, and swimming  You can also fit in more physical activity by taking the stairs instead of the elevator or parking farther away from stores  Ask your healthcare provider about the best exercise plan for you  © Copyright [employer] 2018 Information is for End User's use only and may not be sold, redistributed or otherwise used for commercial purposes   All illustrations and images included in CareNotes® are the copyrighted property of A D A M , Inc  or [address] [place] [address]

## 2019-10-10 NOTE — ASSESSMENT & PLAN NOTE
Needs f/u with GYN Onc - number given and importance of f/u reviewed, currently w/o vaginal symptoms

## 2019-10-23 DIAGNOSIS — G89.4 CHRONIC PAIN SYNDROME: ICD-10-CM

## 2019-10-23 RX ORDER — FENTANYL 25 UG/H
1 PATCH TRANSDERMAL
Qty: 10 PATCH | Refills: 0 | Status: SHIPPED | OUTPATIENT
Start: 2019-10-23 | End: 2019-12-02 | Stop reason: SDUPTHER

## 2019-11-09 DIAGNOSIS — G89.29 CHRONIC LOW BACK PAIN, UNSPECIFIED BACK PAIN LATERALITY, UNSPECIFIED WHETHER SCIATICA PRESENT: ICD-10-CM

## 2019-11-09 DIAGNOSIS — M54.50 CHRONIC LOW BACK PAIN, UNSPECIFIED BACK PAIN LATERALITY, UNSPECIFIED WHETHER SCIATICA PRESENT: ICD-10-CM

## 2019-11-09 DIAGNOSIS — M79.7 FIBROMYALGIA: ICD-10-CM

## 2019-11-09 DIAGNOSIS — F32.9 REACTIVE DEPRESSION: ICD-10-CM

## 2019-11-10 RX ORDER — DULOXETIN HYDROCHLORIDE 20 MG/1
CAPSULE, DELAYED RELEASE ORAL
Qty: 30 CAPSULE | Refills: 3 | Status: SHIPPED | OUTPATIENT
Start: 2019-11-10 | End: 2020-04-06 | Stop reason: SDUPTHER

## 2019-11-20 ENCOUNTER — TELEPHONE (OUTPATIENT)
Dept: FAMILY MEDICINE CLINIC | Facility: HOSPITAL | Age: 54
End: 2019-11-20

## 2019-11-20 ENCOUNTER — TELEPHONE (OUTPATIENT)
Dept: GYNECOLOGIC ONCOLOGY | Facility: CLINIC | Age: 54
End: 2019-11-20

## 2019-11-20 ENCOUNTER — CONSULT (OUTPATIENT)
Dept: GYNECOLOGIC ONCOLOGY | Facility: HOSPITAL | Age: 54
End: 2019-11-20
Payer: MEDICARE

## 2019-11-20 VITALS
HEIGHT: 64 IN | DIASTOLIC BLOOD PRESSURE: 60 MMHG | TEMPERATURE: 98.7 F | HEART RATE: 78 BPM | WEIGHT: 139 LBS | BODY MASS INDEX: 23.73 KG/M2 | SYSTOLIC BLOOD PRESSURE: 100 MMHG

## 2019-11-20 DIAGNOSIS — Z85.41 HISTORY OF CERVICAL CANCER: ICD-10-CM

## 2019-11-20 PROCEDURE — 99202 OFFICE O/P NEW SF 15 MIN: CPT | Performed by: OBSTETRICS & GYNECOLOGY

## 2019-11-20 NOTE — TELEPHONE ENCOUNTER
I called the patient to follow up on port removal ordered by Dr Marshall Cramer  I informed her that since [facility] does not do port removals, her options were  [place] (where her port was placed) or [facility]  She agree to go to [facility] and I currently have a call out to them so she can be scheduled  I also called Dr Fabricio Roque office to inform them that follow up with this patient's call was not needed

## 2019-11-20 NOTE — ASSESSMENT & PLAN NOTE
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

## 2019-11-20 NOTE — TELEPHONE ENCOUNTER
Patient saw Dr Poly Thorne this morning and he would like patient to get her port removed  Patient is asking who Dr Vipin Dougherty recommends to remove it  Patient aware Dr Vipin Dougherty is not in the office until Thursday

## 2019-11-20 NOTE — PROGRESS NOTES
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 using a muscle flap  She has radiation induced vaginal stenosis  She is clinically without evidence of disease recurrence  Performance status is 0     1  Return in 1 year for cervical cancer surveillance  2  Obtain records of   Prior treatment for staging  Thank you for the courtesy of this consultation  All questions were answered by the end of the visit  CHIEF COMPLAINT:   Cervical cancer surveillance          Patient ID: Della Fernandes is a 48 y o  female   65-year-old with a history of cervical cancer treated in approximately 2010 with hysterectomy, bilateral salpingo-oophorectomy who received postoperative radiation therapy with chemo  Her  postoperative course complicated by  Vesicovaginal fistula, right hydronephrosis with chronic pyelonephritis requiring right nephrectomy  She is also status post complex fistula repair with muscle graft  She has dyspareunia  Her last Pap was in January of 2018 which was within normal limits, HPV negative  She did in the past, she tried to improve her sexual function with dilator therapy and even went for a vaginal adhesiolysis which ultimately did not work  She currently has no pelvic pain or vaginal bleeding  She has chronic pain from fibromyalgia and is on a fentanyl patch  Her last CT imaging was in 2013 of the abdomen and pelvis which did not reveal any evidence of disease recurrence  Review of Systems   Constitutional: Negative for activity change and unexpected weight change  HENT: Negative  Eyes: Negative  Respiratory: Negative  Cardiovascular: Negative      Gastrointestinal: Negative for abdominal distention and abdominal pain  Endocrine: Negative  Genitourinary: Negative for pelvic pain and vaginal bleeding  Musculoskeletal: Negative  Skin: Negative  Allergic/Immunologic: Negative  Neurological: Negative  Hematological: Negative  Psychiatric/Behavioral: Negative  Current Outpatient Medications   Medication Sig Dispense Refill    atorvastatin (LIPITOR) 20 mg tablet Take 1 tablet by mouth daily      Calcium 600 MG tablet Take 1 tablet by mouth 2 (two) times a day      Cholecalciferol (VITAMIN D3) 1000 units CHEW Chew 1 tablet daily      DULoxetine (CYMBALTA) 20 mg capsule TAKE 1 CAPSULE BY MOUTH EVERY DAY 30 capsule 3    fentaNYL (DURAGESIC) 25 mcg/hr Place 1 patch on the skin every third dayMax Daily Amount: 1 patch 10 patch 0    levothyroxine 50 mcg tablet Take 1 tablet by mouth daily      LORazepam (ATIVAN) 1 mg tablet Take 1 tablet by mouth daily as needed for anxiety       No current facility-administered medications for this visit          No Known Allergies    Past Medical History:   Diagnosis Date    Aftercare following surgery of the musculoskeletal system     Cervical cancer ([facility] [address] )     Closed fracture of left distal radius     Distal radial fracture     Frequent nocturnal awakening     History of dysuria     History of osteoporosis     Medicare annual wellness visit, subsequent     Multiple joint pain     Muscle ache     PPD screening test     Urethrovaginal fistula        Past Surgical History:   Procedure Laterality Date    NEPHRECTOMY Right     NEPHROSTOMY      with drainage    TONSILLECTOMY      TOTAL ABDOMINAL HYSTERECTOMY W/ BILATERAL SALPINGOOPHORECTOMY      VESICOVAGINAL FISTULA CLOSURE      repair       OB History        3    Para   3    Term   2       1    AB        Living   3       SAB        TAB        Ectopic        Multiple        Live Births   3                 Family History   Problem Relation Age of Onset    Adrenal disorder Mother         adrenal malignant neoplasm    Alcohol abuse Mother    Angelica Crystal Arthritis Mother     Depression Mother     Sleep apnea Mother     Leukemia Mother     Osteoporosis Mother     Alcohol abuse Father     Alzheimer's disease Father     Arthritis Father     Substance Abuse Father     Hypertension Father     Sleep apnea Sister     Heart attack Maternal Grandmother         myocardial infarction    Stroke Maternal Grandmother     Cervical cancer Maternal Grandmother     Alcohol abuse Other         denied alcohol abuse    Cervical cancer Other         malignant neoplasm of cervix uteri    Other Family         dyslipidemia       The following portions of the patient's history were reviewed and updated as appropriate: allergies, current medications, past family history, past medical history, past social history, past surgical history and problem list       Objective:    Blood pressure 100/60, pulse 78, temperature 98 7 °F (37 1 °C), temperature source Tympanic, height 5' 4" (1 626 m), weight 63 kg (139 lb)  Body mass index is 23 86 kg/m²  Physical Exam   Constitutional: She is oriented to person, place, and time  She appears well-developed and well-nourished  No distress  HENT:   Head: Normocephalic and atraumatic  Neck: Normal range of motion  Neck supple  No thyromegaly present  Abdominal: Soft  She exhibits no distension and no mass  There is no tenderness  There is no rebound and no guarding  Genitourinary:   Genitourinary Comments: The external female genitalia is normal  The bartholin's, uretheral and skenes glands are normal  The urethral meatus is normal (midline with no lesions)  Anus without fissure or lesion  Speculum exam reveals   A markedly stenotic vagina with a vaginal length of approximately 2 cm  The mucosa as extensive radiation change  There are no visible masses  Rectal exam notes a surgical absent cervix, uterus and adnexal structures    There is thickening of the pelvic floor consistent with prior radiation therapy  No masses or fullness  Bladder is without fullness, mass or tenderness  Musculoskeletal: She exhibits no edema  Lymphadenopathy:     She has no cervical adenopathy  Neurological: She is alert and oriented to person, place, and time  Skin: Skin is warm and dry  She is not diaphoretic  Psychiatric: She has a normal mood and affect   Her behavior is normal  Judgment and thought content normal          No results found for:   Lab Results   Component Value Date    WBC 6 44 03/21/2019    HGB 12 2 03/21/2019    HCT 37 2 03/21/2019    MCV 96 03/21/2019     03/21/2019     Lab Results   Component Value Date     02/27/2017    K 3 9 03/21/2019     03/21/2019    CO2 28 03/21/2019    ANIONGAP 8 08/04/2014    BUN 18 03/21/2019    CREATININE 1 07 03/21/2019    GLUCOSE 100 08/04/2014    GLUF 78 03/21/2019    CALCIUM 9 2 03/21/2019    AST 17 03/21/2019    ALT 14 03/21/2019    ALKPHOS 76 03/21/2019    PROT 7 0 02/27/2017    BILITOT 0 3 02/27/2017    EGFR 60 03/21/2019

## 2019-12-02 DIAGNOSIS — G89.4 CHRONIC PAIN SYNDROME: ICD-10-CM

## 2019-12-02 RX ORDER — FENTANYL 25 UG/H
1 PATCH TRANSDERMAL
Qty: 10 PATCH | Refills: 0 | Status: SHIPPED | OUTPATIENT
Start: 2019-12-02 | End: 2020-01-08 | Stop reason: SDUPTHER

## 2019-12-09 ENCOUNTER — TELEPHONE (OUTPATIENT)
Dept: RADIOLOGY | Facility: HOSPITAL | Age: 54
End: 2019-12-09

## 2019-12-09 RX ORDER — SODIUM CHLORIDE 9 MG/ML
75 INJECTION, SOLUTION INTRAVENOUS CONTINUOUS
Status: CANCELLED | OUTPATIENT
Start: 2019-12-09

## 2020-01-06 ENCOUNTER — TELEPHONE (OUTPATIENT)
Dept: RADIOLOGY | Facility: HOSPITAL | Age: 55
End: 2020-01-06

## 2020-01-08 DIAGNOSIS — G89.4 CHRONIC PAIN SYNDROME: ICD-10-CM

## 2020-01-08 RX ORDER — FENTANYL 25 UG/H
1 PATCH TRANSDERMAL
Qty: 10 PATCH | Refills: 0 | Status: SHIPPED | OUTPATIENT
Start: 2020-01-08 | End: 2020-02-07 | Stop reason: SDUPTHER

## 2020-01-09 ENCOUNTER — TELEPHONE (OUTPATIENT)
Dept: INPATIENT UNIT | Facility: HOSPITAL | Age: 55
End: 2020-01-09

## 2020-01-10 ENCOUNTER — HOSPITAL ENCOUNTER (OUTPATIENT)
Dept: RADIOLOGY | Facility: HOSPITAL | Age: 55
Discharge: HOME | End: 2020-01-10
Attending: OBSTETRICS & GYNECOLOGY | Admitting: RADIOLOGY
Payer: MEDICARE

## 2020-01-10 VITALS
OXYGEN SATURATION: 100 % | WEIGHT: 132 LBS | TEMPERATURE: 97.5 F | DIASTOLIC BLOOD PRESSURE: 53 MMHG | SYSTOLIC BLOOD PRESSURE: 88 MMHG | RESPIRATION RATE: 16 BRPM | HEIGHT: 64 IN | HEART RATE: 73 BPM | BODY MASS INDEX: 22.53 KG/M2

## 2020-01-10 DIAGNOSIS — Z85.41 HISTORY OF CERVICAL CANCER: ICD-10-CM

## 2020-01-10 LAB
ERYTHROCYTE [DISTWIDTH] IN BLOOD BY AUTOMATED COUNT: 12.6 % (ref 11.6–15.1)
HCT VFR BLD AUTO: 37 % (ref 34.8–46.1)
HGB BLD-MCNC: 12.1 G/DL (ref 11.5–15.4)
INR PPP: 0.9 (ref 0.84–1.19)
MCH RBC QN AUTO: 31.1 PG (ref 26.8–34.3)
MCHC RBC AUTO-ENTMCNC: 32.7 G/DL (ref 31.4–37.4)
MCV RBC AUTO: 95 FL (ref 82–98)
PLATELET # BLD AUTO: 224 THOUSANDS/UL (ref 149–390)
PMV BLD AUTO: 10.2 FL (ref 8.9–12.7)
PROTHROMBIN TIME: 11.8 SECONDS (ref 11.6–14.5)
RBC # BLD AUTO: 3.89 MILLION/UL (ref 3.81–5.12)
WBC # BLD AUTO: 5.5 THOUSAND/UL (ref 4.31–10.16)

## 2020-01-10 PROCEDURE — C1725 CATH, TRANSLUMIN NON-LASER: HCPCS

## 2020-01-10 PROCEDURE — 75827 VEIN X-RAY CHEST: CPT

## 2020-01-10 PROCEDURE — 99152 MOD SED SAME PHYS/QHP 5/>YRS: CPT

## 2020-01-10 PROCEDURE — C1894 INTRO/SHEATH, NON-LASER: HCPCS

## 2020-01-10 PROCEDURE — 36595 MECH REMOV TUNNELED CV CATH: CPT

## 2020-01-10 PROCEDURE — C1769 GUIDE WIRE: HCPCS

## 2020-01-10 PROCEDURE — 36590 REMOVAL TUNNELED CV CATH: CPT | Performed by: RADIOLOGY

## 2020-01-10 PROCEDURE — 85027 COMPLETE CBC AUTOMATED: CPT | Performed by: RADIOLOGY

## 2020-01-10 PROCEDURE — C1773 RET DEV, INSERTABLE: HCPCS

## 2020-01-10 PROCEDURE — C1887 CATHETER, GUIDING: HCPCS

## 2020-01-10 PROCEDURE — 99152 MOD SED SAME PHYS/QHP 5/>YRS: CPT | Performed by: RADIOLOGY

## 2020-01-10 PROCEDURE — 36590 REMOVAL TUNNELED CV CATH: CPT

## 2020-01-10 PROCEDURE — C1757 CATH, THROMBECTOMY/EMBOLECT: HCPCS

## 2020-01-10 PROCEDURE — 99153 MOD SED SAME PHYS/QHP EA: CPT

## 2020-01-10 PROCEDURE — 85610 PROTHROMBIN TIME: CPT | Performed by: RADIOLOGY

## 2020-01-10 RX ORDER — FENTANYL CITRATE 50 UG/ML
INJECTION, SOLUTION INTRAMUSCULAR; INTRAVENOUS CODE/TRAUMA/SEDATION MEDICATION
Status: COMPLETED | OUTPATIENT
Start: 2020-01-10 | End: 2020-01-10

## 2020-01-10 RX ORDER — CEFAZOLIN SODIUM 1 G/3ML
INJECTION, POWDER, FOR SOLUTION INTRAMUSCULAR; INTRAVENOUS CODE/TRAUMA/SEDATION MEDICATION
Status: COMPLETED | OUTPATIENT
Start: 2020-01-10 | End: 2020-01-10

## 2020-01-10 RX ORDER — MIDAZOLAM HYDROCHLORIDE 2 MG/2ML
INJECTION, SOLUTION INTRAMUSCULAR; INTRAVENOUS CODE/TRAUMA/SEDATION MEDICATION
Status: COMPLETED | OUTPATIENT
Start: 2020-01-10 | End: 2020-01-10

## 2020-01-10 RX ORDER — DIPHENHYDRAMINE HYDROCHLORIDE 50 MG/ML
INJECTION INTRAMUSCULAR; INTRAVENOUS CODE/TRAUMA/SEDATION MEDICATION
Status: COMPLETED | OUTPATIENT
Start: 2020-01-10 | End: 2020-01-10

## 2020-01-10 RX ORDER — LIDOCAINE WITH 8.4% SOD BICARB 0.9%(10ML)
SYRINGE (ML) INJECTION CODE/TRAUMA/SEDATION MEDICATION
Status: COMPLETED | OUTPATIENT
Start: 2020-01-10 | End: 2020-01-10

## 2020-01-10 RX ORDER — SODIUM CHLORIDE 9 MG/ML
75 INJECTION, SOLUTION INTRAVENOUS CONTINUOUS
Status: DISCONTINUED | OUTPATIENT
Start: 2020-01-10 | End: 2020-01-10 | Stop reason: HOSPADM

## 2020-01-10 RX ADMIN — MIDAZOLAM 0.5 MG: 1 INJECTION INTRAMUSCULAR; INTRAVENOUS at 14:47

## 2020-01-10 RX ADMIN — MIDAZOLAM 0.5 MG: 1 INJECTION INTRAMUSCULAR; INTRAVENOUS at 14:29

## 2020-01-10 RX ADMIN — IOHEXOL 20 ML: 350 INJECTION, SOLUTION INTRAVENOUS at 17:59

## 2020-01-10 RX ADMIN — CEFAZOLIN 1000 MG: 1 INJECTION, POWDER, FOR SOLUTION INTRAVENOUS at 15:24

## 2020-01-10 RX ADMIN — MIDAZOLAM 0.5 MG: 1 INJECTION INTRAMUSCULAR; INTRAVENOUS at 13:18

## 2020-01-10 RX ADMIN — FENTANYL CITRATE 25 MCG: 50 INJECTION, SOLUTION INTRAMUSCULAR; INTRAVENOUS at 12:59

## 2020-01-10 RX ADMIN — FENTANYL CITRATE 25 MCG: 50 INJECTION, SOLUTION INTRAMUSCULAR; INTRAVENOUS at 13:17

## 2020-01-10 RX ADMIN — FENTANYL CITRATE 25 MCG: 50 INJECTION, SOLUTION INTRAMUSCULAR; INTRAVENOUS at 14:00

## 2020-01-10 RX ADMIN — SODIUM CHLORIDE 75 ML/HR: 0.9 INJECTION, SOLUTION INTRAVENOUS at 12:24

## 2020-01-10 RX ADMIN — FENTANYL CITRATE 25 MCG: 50 INJECTION, SOLUTION INTRAMUSCULAR; INTRAVENOUS at 16:40

## 2020-01-10 RX ADMIN — DIPHENHYDRAMINE HYDROCHLORIDE 25 MG: 50 INJECTION, SOLUTION INTRAMUSCULAR; INTRAVENOUS at 13:08

## 2020-01-10 RX ADMIN — FENTANYL CITRATE 25 MCG: 50 INJECTION, SOLUTION INTRAMUSCULAR; INTRAVENOUS at 14:47

## 2020-01-10 RX ADMIN — MIDAZOLAM 0.5 MG: 1 INJECTION INTRAMUSCULAR; INTRAVENOUS at 14:31

## 2020-01-10 RX ADMIN — FENTANYL CITRATE 25 MCG: 50 INJECTION, SOLUTION INTRAMUSCULAR; INTRAVENOUS at 14:31

## 2020-01-10 RX ADMIN — LIDOCAINE HYDROCHLORIDE 3 ML: 10 INJECTION, SOLUTION INFILTRATION; PERINEURAL at 14:38

## 2020-01-10 RX ADMIN — MIDAZOLAM 0.5 MG: 1 INJECTION INTRAMUSCULAR; INTRAVENOUS at 13:12

## 2020-01-10 RX ADMIN — MIDAZOLAM 1 MG: 1 INJECTION INTRAMUSCULAR; INTRAVENOUS at 12:59

## 2020-01-10 NOTE — BRIEF OP NOTE (RAD/CATH)
IR PORT REMOVAL Procedure Note    PATIENT NAME: Neil Bullock  : 1965  MRN: [id number]    Pre-op Diagnosis:   1  History of cervical cancer      Post-op Diagnosis:   1  History of cervical cancer        Surgeon:   Hayden Collins MD  Assistants:     No qualified resident was available, Resident is only observing    Estimated Blood Loss:  Minimal  Findings:     Technically successful right chest [id number] removal via a combination of right internal jugular and femoral access  Central venogram demonstrated recalcitrant fibrin sheath adhering to the tip of the port catheter resulting in difficulty in removal of the catheter  Access was gained via the right common femoral vein and 8 Armenian long vascular sheath was placed  Fibrin maceration and stripping was performed with pigtail catheter fragmentation, Munira balloon sweep, 8 mm x 2 cm semi-compliant balloon angioplasty as well as loop snare  Ultimately, the fibrin sheath was successfully  from the catheter and the port and catheter were removed in its entirety      Specimens:  None    Complications:  None    Anesthesia: Conscious sedation    Hayden Collins MD     Date: 1/10/2020  Time: 5:22 PM

## 2020-01-10 NOTE — DISCHARGE INSTRUCTIONS
Implanted Venous Access Port Removal    WHAT YOU NEED TO KNOW:   An implanted venous access port is a device used to give treatments and take blood  It may also be called a central venous access device (CVAD)  The port is a small container that is placed under your skin, usually in your upper chest  A port can also be placed in your arm or abdomen  The port is attached to a catheter that enters a large vein  DISCHARGE INSTRUCTIONS:   Resume your normal diet  Small sips of flat soda will help with mild nausea  Prevent an infection:     Wash your hands often  Use soap and water  Clean your hands before and  after you care for your incision  Check your skin for infection every day  Look for redness, swelling, or fluid oozing from the incision site  Dressing may come off in 24 hours  Medical glue will peel off on its own in 5 to 10 days  You may shower 24 hours after procedure  Follow up with your healthcare provider as directed  Write down your questions so you remember to ask them during your visits  Activity:  You may return to your daily activities when the area heals  Contact Interventional Radiology at [phone] [place] PATIENTS: Contact Interventional Radiology at [phone]) [address] PATIENTS: Contact Interventional Radiology at [phone]) if:     You have a fever  You have persistent nausea  Your inciscion site is red, swollen, or draining pus  You have questions or concerns about your condition or care  Seek care immediately or call 911 if:  Blood soaks through your bandage  The skin over or around your incision breaks open  Your heart is jumping or fluttering  You have a headache, blurred vision, and feel confused  You have pain in your arm, neck, shoulder, or chest     You have trouble breathing that is getting worse over time

## 2020-01-10 NOTE — SEDATION DOCUMENTATION
IR Procedure Bedrest Start Time is 1700 x2hrs  Right groin site CDI with steristrips  Right chest incision CDI  Report called to [facility]

## 2020-02-07 DIAGNOSIS — G89.4 CHRONIC PAIN SYNDROME: ICD-10-CM

## 2020-02-07 DIAGNOSIS — F41.9 ANXIETY DISORDER, UNSPECIFIED TYPE: Primary | ICD-10-CM

## 2020-02-07 RX ORDER — FENTANYL 25 UG/H
1 PATCH TRANSDERMAL
Qty: 10 PATCH | Refills: 0 | Status: SHIPPED | OUTPATIENT
Start: 2020-02-07 | End: 2020-03-09 | Stop reason: SDUPTHER

## 2020-02-07 RX ORDER — LORAZEPAM 1 MG/1
1 TABLET ORAL DAILY PRN
Qty: 30 TABLET | Refills: 0 | Status: SHIPPED | OUTPATIENT
Start: 2020-02-07 | End: 2020-04-06 | Stop reason: SDUPTHER

## 2020-03-09 DIAGNOSIS — G89.4 CHRONIC PAIN SYNDROME: ICD-10-CM

## 2020-03-10 RX ORDER — FENTANYL 25 UG/H
1 PATCH TRANSDERMAL
Qty: 10 PATCH | Refills: 0 | Status: SHIPPED | OUTPATIENT
Start: 2020-03-10 | End: 2020-04-06 | Stop reason: SDUPTHER

## 2020-03-10 NOTE — TELEPHONE ENCOUNTER
Please notify pt that her Fentanyl patch was approved but no further meds will be given until she is seen - last seen in Oct and had mood med adjusted and was supposed to be seen in 4-6 wks - overdue for follow up - please schedule

## 2020-04-06 ENCOUNTER — TELEMEDICINE (OUTPATIENT)
Dept: FAMILY MEDICINE CLINIC | Facility: HOSPITAL | Age: 55
End: 2020-04-06
Payer: MEDICARE

## 2020-04-06 VITALS — HEIGHT: 64 IN | WEIGHT: 135 LBS | BODY MASS INDEX: 23.05 KG/M2

## 2020-04-06 DIAGNOSIS — G89.29 CHRONIC LOW BACK PAIN, UNSPECIFIED BACK PAIN LATERALITY, UNSPECIFIED WHETHER SCIATICA PRESENT: ICD-10-CM

## 2020-04-06 DIAGNOSIS — G89.4 CHRONIC PAIN SYNDROME: ICD-10-CM

## 2020-04-06 DIAGNOSIS — M54.50 CHRONIC LOW BACK PAIN, UNSPECIFIED BACK PAIN LATERALITY, UNSPECIFIED WHETHER SCIATICA PRESENT: ICD-10-CM

## 2020-04-06 DIAGNOSIS — F32.9 REACTIVE DEPRESSION: Primary | ICD-10-CM

## 2020-04-06 DIAGNOSIS — F41.9 ANXIETY DISORDER, UNSPECIFIED TYPE: ICD-10-CM

## 2020-04-06 DIAGNOSIS — E03.9 HYPOTHYROIDISM, UNSPECIFIED TYPE: ICD-10-CM

## 2020-04-06 DIAGNOSIS — M79.7 FIBROMYALGIA: ICD-10-CM

## 2020-04-06 PROCEDURE — 99214 OFFICE O/P EST MOD 30 MIN: CPT | Performed by: INTERNAL MEDICINE

## 2020-04-06 RX ORDER — LORAZEPAM 1 MG/1
1 TABLET ORAL DAILY PRN
Qty: 30 TABLET | Refills: 0 | Status: SHIPPED | OUTPATIENT
Start: 2020-04-06

## 2020-04-06 RX ORDER — FENTANYL 25 UG/H
1 PATCH TRANSDERMAL
Qty: 10 PATCH | Refills: 0 | Status: SHIPPED | OUTPATIENT
Start: 2020-04-06 | End: 2020-05-27 | Stop reason: SDUPTHER

## 2020-04-06 RX ORDER — DULOXETIN HYDROCHLORIDE 20 MG/1
20 CAPSULE, DELAYED RELEASE ORAL DAILY
Qty: 30 CAPSULE | Refills: 5 | Status: SHIPPED | OUTPATIENT
Start: 2020-04-06 | End: 2020-12-31 | Stop reason: SDUPTHER

## 2020-04-07 DIAGNOSIS — E03.9 HYPOTHYROIDISM, UNSPECIFIED TYPE: Primary | ICD-10-CM

## 2020-04-07 LAB
ALBUMIN SERPL-MCNC: 3.9 G/DL (ref 3.6–5.1)
ALBUMIN/GLOB SERPL: 1.3 (CALC) (ref 1–2.5)
ALP SERPL-CCNC: 65 U/L (ref 37–153)
ALT SERPL-CCNC: 11 U/L (ref 6–29)
AST SERPL-CCNC: 17 U/L (ref 10–35)
BILIRUB SERPL-MCNC: 0.4 MG/DL (ref 0.2–1.2)
BUN SERPL-MCNC: 15 MG/DL (ref 7–25)
BUN/CREAT SERPL: NORMAL (CALC) (ref 6–22)
CALCIUM SERPL-MCNC: 9.3 MG/DL (ref 8.6–10.4)
CHLORIDE SERPL-SCNC: 102 MMOL/L (ref 98–110)
CHOLEST SERPL-MCNC: 217 MG/DL
CHOLEST/HDLC SERPL: 3.9 (CALC)
CO2 SERPL-SCNC: 29 MMOL/L (ref 20–32)
CREAT SERPL-MCNC: 1.03 MG/DL (ref 0.5–1.05)
GLOBULIN SER CALC-MCNC: 3 G/DL (CALC) (ref 1.9–3.7)
GLUCOSE SERPL-MCNC: 81 MG/DL (ref 65–99)
HCV AB S/CO SERPL IA: 0.03
HCV AB SERPL QL IA: NORMAL
HDLC SERPL-MCNC: 56 MG/DL
LDLC SERPL CALC-MCNC: 144 MG/DL (CALC)
NONHDLC SERPL-MCNC: 161 MG/DL (CALC)
POTASSIUM SERPL-SCNC: 4.2 MMOL/L (ref 3.5–5.3)
PROT SERPL-MCNC: 6.9 G/DL (ref 6.1–8.1)
SL AMB EGFR AFRICAN AMERICAN: 72 ML/MIN/1.73M2
SL AMB EGFR NON AFRICAN AMERICAN: 62 ML/MIN/1.73M2
SODIUM SERPL-SCNC: 139 MMOL/L (ref 135–146)
T4 FREE SERPL-MCNC: 1.1 NG/DL (ref 0.8–1.8)
TRIGL SERPL-MCNC: 72 MG/DL
TSH SERPL-ACNC: 7.49 MIU/L

## 2020-04-07 RX ORDER — LEVOTHYROXINE SODIUM 0.07 MG/1
75 TABLET ORAL DAILY
Qty: 30 TABLET | Refills: 1 | Status: SHIPPED | OUTPATIENT
Start: 2020-04-07 | End: 2020-04-22

## 2020-04-13 ENCOUNTER — APPOINTMENT (OUTPATIENT)
Dept: LAB | Facility: HOSPITAL | Age: 55
End: 2020-04-13
Payer: MEDICARE

## 2020-04-13 DIAGNOSIS — Z12.11 SCREENING FOR COLON CANCER: ICD-10-CM

## 2020-04-13 LAB — HEMOCCULT STL QL IA: NEGATIVE

## 2020-04-13 PROCEDURE — G0328 FECAL BLOOD SCRN IMMUNOASSAY: HCPCS

## 2020-04-21 DIAGNOSIS — E03.9 HYPOTHYROIDISM, UNSPECIFIED TYPE: ICD-10-CM

## 2020-04-22 RX ORDER — LEVOTHYROXINE SODIUM 0.07 MG/1
TABLET ORAL
Qty: 90 TABLET | Refills: 1 | Status: SHIPPED | OUTPATIENT
Start: 2020-04-22 | End: 2020-04-23 | Stop reason: SDUPTHER

## 2020-04-23 DIAGNOSIS — E03.9 HYPOTHYROIDISM, UNSPECIFIED TYPE: ICD-10-CM

## 2020-04-23 RX ORDER — LEVOTHYROXINE SODIUM 0.07 MG/1
75 TABLET ORAL DAILY
Qty: 90 TABLET | Refills: 1 | Status: SHIPPED | OUTPATIENT
Start: 2020-04-23

## 2020-05-27 DIAGNOSIS — G89.4 CHRONIC PAIN SYNDROME: ICD-10-CM

## 2020-05-27 RX ORDER — FENTANYL 25 UG/H
1 PATCH TRANSDERMAL
Qty: 10 PATCH | Refills: 0 | Status: SHIPPED | OUTPATIENT
Start: 2020-05-27 | End: 2020-07-01 | Stop reason: SDUPTHER

## 2020-07-01 DIAGNOSIS — G89.4 CHRONIC PAIN SYNDROME: ICD-10-CM

## 2020-07-01 RX ORDER — FENTANYL 25 UG/H
1 PATCH TRANSDERMAL
Qty: 10 PATCH | Refills: 0 | Status: SHIPPED | OUTPATIENT
Start: 2020-07-01 | End: 2020-08-18 | Stop reason: SDUPTHER

## 2020-08-18 DIAGNOSIS — G89.4 CHRONIC PAIN SYNDROME: ICD-10-CM

## 2020-08-18 RX ORDER — FENTANYL 25 UG/H
1 PATCH TRANSDERMAL
Qty: 10 PATCH | Refills: 0 | Status: SHIPPED | OUTPATIENT
Start: 2020-08-18 | End: 2020-10-06 | Stop reason: SDUPTHER

## 2020-09-08 ENCOUNTER — TELEPHONE (OUTPATIENT)
Dept: FAMILY MEDICINE CLINIC | Facility: HOSPITAL | Age: 55
End: 2020-09-08

## 2020-09-08 DIAGNOSIS — E28.39 MENOPAUSE OVARIAN FAILURE: Primary | ICD-10-CM

## 2020-10-06 DIAGNOSIS — G89.4 CHRONIC PAIN SYNDROME: ICD-10-CM

## 2020-10-06 RX ORDER — FENTANYL 25 UG/H
1 PATCH TRANSDERMAL
Qty: 10 PATCH | Refills: 0 | Status: SHIPPED | OUTPATIENT
Start: 2020-10-06 | End: 2020-11-03 | Stop reason: SDUPTHER

## 2020-11-03 DIAGNOSIS — G89.4 CHRONIC PAIN SYNDROME: ICD-10-CM

## 2020-11-03 RX ORDER — FENTANYL 25 UG/H
1 PATCH TRANSDERMAL
Qty: 10 PATCH | Refills: 0 | Status: SHIPPED | OUTPATIENT
Start: 2020-11-05 | End: 2020-12-21 | Stop reason: SDUPTHER

## 2020-11-30 ENCOUNTER — TELEPHONE (OUTPATIENT)
Dept: GYNECOLOGIC ONCOLOGY | Facility: CLINIC | Age: 55
End: 2020-11-30

## 2020-12-02 ENCOUNTER — OFFICE VISIT (OUTPATIENT)
Dept: GYNECOLOGIC ONCOLOGY | Facility: HOSPITAL | Age: 55
End: 2020-12-02
Payer: MEDICARE

## 2020-12-02 VITALS
TEMPERATURE: 97.9 F | RESPIRATION RATE: 18 BRPM | SYSTOLIC BLOOD PRESSURE: 108 MMHG | BODY MASS INDEX: 25.81 KG/M2 | HEIGHT: 64 IN | HEART RATE: 72 BPM | WEIGHT: 151.2 LBS | DIASTOLIC BLOOD PRESSURE: 60 MMHG

## 2020-12-02 DIAGNOSIS — Z85.41 HISTORY OF CERVICAL CANCER: Primary | ICD-10-CM

## 2020-12-02 PROCEDURE — 99212 OFFICE O/P EST SF 10 MIN: CPT | Performed by: OBSTETRICS & GYNECOLOGY

## 2020-12-21 DIAGNOSIS — G89.4 CHRONIC PAIN SYNDROME: ICD-10-CM

## 2020-12-22 RX ORDER — FENTANYL 25 UG/H
1 PATCH TRANSDERMAL
Qty: 10 PATCH | Refills: 0 | Status: SHIPPED | OUTPATIENT
Start: 2020-12-22 | End: 2021-02-05 | Stop reason: SDUPTHER

## 2020-12-31 ENCOUNTER — TELEMEDICINE (OUTPATIENT)
Dept: FAMILY MEDICINE CLINIC | Facility: HOSPITAL | Age: 55
End: 2020-12-31
Payer: MEDICARE

## 2020-12-31 VITALS — TEMPERATURE: 97.4 F | WEIGHT: 151 LBS | HEIGHT: 64 IN | BODY MASS INDEX: 25.78 KG/M2

## 2020-12-31 DIAGNOSIS — E03.9 HYPOTHYROIDISM, UNSPECIFIED TYPE: ICD-10-CM

## 2020-12-31 DIAGNOSIS — M79.7 FIBROMYALGIA: ICD-10-CM

## 2020-12-31 DIAGNOSIS — M54.50 CHRONIC LOW BACK PAIN, UNSPECIFIED BACK PAIN LATERALITY, UNSPECIFIED WHETHER SCIATICA PRESENT: ICD-10-CM

## 2020-12-31 DIAGNOSIS — F32.9 REACTIVE DEPRESSION: ICD-10-CM

## 2020-12-31 DIAGNOSIS — Z00.00 MEDICARE ANNUAL WELLNESS VISIT, SUBSEQUENT: ICD-10-CM

## 2020-12-31 DIAGNOSIS — E66.3 OVERWEIGHT: ICD-10-CM

## 2020-12-31 DIAGNOSIS — Z12.31 ENCOUNTER FOR SCREENING MAMMOGRAM FOR MALIGNANT NEOPLASM OF BREAST: ICD-10-CM

## 2020-12-31 DIAGNOSIS — G89.29 CHRONIC LOW BACK PAIN, UNSPECIFIED BACK PAIN LATERALITY, UNSPECIFIED WHETHER SCIATICA PRESENT: ICD-10-CM

## 2020-12-31 DIAGNOSIS — M85.80 OSTEOPENIA, UNSPECIFIED LOCATION: ICD-10-CM

## 2020-12-31 DIAGNOSIS — C53.9 MALIGNANT NEOPLASM OF CERVIX, UNSPECIFIED SITE (HCC): Primary | ICD-10-CM

## 2020-12-31 DIAGNOSIS — Z12.11 COLON CANCER SCREENING: ICD-10-CM

## 2020-12-31 DIAGNOSIS — E78.2 MIXED HYPERLIPIDEMIA: ICD-10-CM

## 2020-12-31 PROCEDURE — 99214 OFFICE O/P EST MOD 30 MIN: CPT | Performed by: INTERNAL MEDICINE

## 2020-12-31 PROCEDURE — G0439 PPPS, SUBSEQ VISIT: HCPCS | Performed by: INTERNAL MEDICINE

## 2020-12-31 RX ORDER — DULOXETIN HYDROCHLORIDE 30 MG/1
30 CAPSULE, DELAYED RELEASE ORAL DAILY
Qty: 30 CAPSULE | Refills: 5 | Status: SHIPPED | OUTPATIENT
Start: 2020-12-31 | End: 2021-04-09 | Stop reason: SDUPTHER

## 2021-02-05 DIAGNOSIS — G89.4 CHRONIC PAIN SYNDROME: ICD-10-CM

## 2021-02-05 RX ORDER — FENTANYL 25 UG/H
1 PATCH TRANSDERMAL
Qty: 10 PATCH | Refills: 0 | Status: SHIPPED | OUTPATIENT
Start: 2021-02-05 | End: 2021-03-18 | Stop reason: SDUPTHER

## 2021-03-18 DIAGNOSIS — G89.4 CHRONIC PAIN SYNDROME: ICD-10-CM

## 2021-03-18 RX ORDER — FENTANYL 25 UG/H
1 PATCH TRANSDERMAL
Qty: 10 PATCH | Refills: 0 | Status: SHIPPED | OUTPATIENT
Start: 2021-03-18 | End: 2021-04-13 | Stop reason: SDUPTHER

## 2021-04-08 ENCOUNTER — TELEPHONE (OUTPATIENT)
Dept: FAMILY MEDICINE CLINIC | Facility: HOSPITAL | Age: 56
End: 2021-04-08

## 2021-04-08 NOTE — TELEPHONE ENCOUNTER
Left message for patient  Records request received from [address] [place] [address]  They are requesting her last office note  pls confirm that it is ok to send this      The request is on my bulletin board

## 2021-04-09 ENCOUNTER — OFFICE VISIT (OUTPATIENT)
Dept: FAMILY MEDICINE CLINIC | Facility: HOSPITAL | Age: 56
End: 2021-04-09
Payer: MEDICARE

## 2021-04-09 VITALS
HEART RATE: 80 BPM | DIASTOLIC BLOOD PRESSURE: 72 MMHG | BODY MASS INDEX: 24.72 KG/M2 | WEIGHT: 144.8 LBS | TEMPERATURE: 96.9 F | SYSTOLIC BLOOD PRESSURE: 102 MMHG | HEIGHT: 64 IN

## 2021-04-09 DIAGNOSIS — E03.9 HYPOTHYROIDISM, UNSPECIFIED TYPE: ICD-10-CM

## 2021-04-09 DIAGNOSIS — E78.2 MIXED HYPERLIPIDEMIA: Primary | ICD-10-CM

## 2021-04-09 DIAGNOSIS — G89.29 CHRONIC LOW BACK PAIN, UNSPECIFIED BACK PAIN LATERALITY, UNSPECIFIED WHETHER SCIATICA PRESENT: ICD-10-CM

## 2021-04-09 DIAGNOSIS — F33.9 DEPRESSION, RECURRENT (HCC): ICD-10-CM

## 2021-04-09 DIAGNOSIS — M54.50 CHRONIC LOW BACK PAIN, UNSPECIFIED BACK PAIN LATERALITY, UNSPECIFIED WHETHER SCIATICA PRESENT: ICD-10-CM

## 2021-04-09 DIAGNOSIS — M79.7 FIBROMYALGIA: ICD-10-CM

## 2021-04-09 LAB
ALBUMIN SERPL-MCNC: 4 G/DL (ref 3.6–5.1)
ALBUMIN/GLOB SERPL: 1.5 (CALC) (ref 1–2.5)
ALP SERPL-CCNC: 72 U/L (ref 37–153)
ALT SERPL-CCNC: 10 U/L (ref 6–29)
AST SERPL-CCNC: 15 U/L (ref 10–35)
BASOPHILS # BLD AUTO: 80 CELLS/UL (ref 0–200)
BASOPHILS NFR BLD AUTO: 1.4 %
BILIRUB SERPL-MCNC: 0.4 MG/DL (ref 0.2–1.2)
BUN SERPL-MCNC: 19 MG/DL (ref 7–25)
BUN/CREAT SERPL: NORMAL (CALC) (ref 6–22)
CALCIUM SERPL-MCNC: 9.2 MG/DL (ref 8.6–10.4)
CHLORIDE SERPL-SCNC: 103 MMOL/L (ref 98–110)
CHOLEST SERPL-MCNC: 220 MG/DL
CHOLEST/HDLC SERPL: 4.3 (CALC)
CO2 SERPL-SCNC: 32 MMOL/L (ref 20–32)
CREAT SERPL-MCNC: 0.97 MG/DL (ref 0.5–1.05)
EOSINOPHIL # BLD AUTO: 1094 CELLS/UL (ref 15–500)
EOSINOPHIL NFR BLD AUTO: 19.2 %
ERYTHROCYTE [DISTWIDTH] IN BLOOD BY AUTOMATED COUNT: 12.7 % (ref 11–15)
GLOBULIN SER CALC-MCNC: 2.7 G/DL (CALC) (ref 1.9–3.7)
GLUCOSE SERPL-MCNC: 90 MG/DL (ref 65–99)
HCT VFR BLD AUTO: 37.4 % (ref 35–45)
HDLC SERPL-MCNC: 51 MG/DL
HGB BLD-MCNC: 12.3 G/DL (ref 11.7–15.5)
LDLC SERPL CALC-MCNC: 151 MG/DL (CALC)
LYMPHOCYTES # BLD AUTO: 1596 CELLS/UL (ref 850–3900)
LYMPHOCYTES NFR BLD AUTO: 28 %
MCH RBC QN AUTO: 30.8 PG (ref 27–33)
MCHC RBC AUTO-ENTMCNC: 32.9 G/DL (ref 32–36)
MCV RBC AUTO: 93.5 FL (ref 80–100)
MONOCYTES # BLD AUTO: 564 CELLS/UL (ref 200–950)
MONOCYTES NFR BLD AUTO: 9.9 %
NEUTROPHILS # BLD AUTO: 2366 CELLS/UL (ref 1500–7800)
NEUTROPHILS NFR BLD AUTO: 41.5 %
NONHDLC SERPL-MCNC: 169 MG/DL (CALC)
PLATELET # BLD AUTO: 226 THOUSAND/UL (ref 140–400)
PMV BLD REES-ECKER: 10.9 FL (ref 7.5–12.5)
POTASSIUM SERPL-SCNC: 4.3 MMOL/L (ref 3.5–5.3)
PROT SERPL-MCNC: 6.7 G/DL (ref 6.1–8.1)
RBC # BLD AUTO: 4 MILLION/UL (ref 3.8–5.1)
SL AMB EGFR AFRICAN AMERICAN: 77 ML/MIN/1.73M2
SL AMB EGFR NON AFRICAN AMERICAN: 67 ML/MIN/1.73M2
SODIUM SERPL-SCNC: 139 MMOL/L (ref 135–146)
T4 FREE SERPL-MCNC: 0.9 NG/DL (ref 0.8–1.8)
TRIGL SERPL-MCNC: 79 MG/DL
TSH SERPL-ACNC: 3.55 MIU/L
TSH SERPL-ACNC: 3.61 MIU/L
WBC # BLD AUTO: 5.7 THOUSAND/UL (ref 3.8–10.8)

## 2021-04-09 PROCEDURE — 99214 OFFICE O/P EST MOD 30 MIN: CPT | Performed by: INTERNAL MEDICINE

## 2021-04-09 RX ORDER — FLUOXETINE HYDROCHLORIDE 40 MG/1
CAPSULE ORAL
Refills: 0 | OUTPATIENT
Start: 2021-04-09

## 2021-04-09 RX ORDER — DULOXETIN HYDROCHLORIDE 60 MG/1
60 CAPSULE, DELAYED RELEASE ORAL DAILY
Qty: 30 CAPSULE | Refills: 5 | Status: SHIPPED | OUTPATIENT
Start: 2021-04-09

## 2021-04-09 RX ORDER — DULOXETINE 40 MG/1
40 CAPSULE, DELAYED RELEASE ORAL DAILY
Qty: 30 CAPSULE | Refills: 1 | Status: SHIPPED | OUTPATIENT
Start: 2021-04-09 | End: 2021-04-09

## 2021-04-09 NOTE — TELEPHONE ENCOUNTER
Prozac not adequate substitute for Cymbalta - is it just the 40 mg dose that is not covered - if not I would change it to 60 mg daily

## 2021-04-09 NOTE — PROGRESS NOTES
Assessment/Plan:    Hyperlipidemia  FLP still with elevated TC and LDL - pt admits to not taking statin daily - urged to do so and healthy diet/exercise encouraged, re-eval FLP annually    Hypothyroidism  TFT's at goal and only with some fatigue as thyroid symptom, con't current regimen for now, re-eval TFT's annually    Fibromyalgia  No great benefit with increase in Cymbalta from 20 to 30 mg, notes some fatigue with increase in dose but tolerable, increase to 40 mg and re-yvonne teto 6 wks, discussed medical marijuana and advised pt to take next step and call office to establish care, once pain better will discuss weaning off the Fentanyl patch    Depression, recurrent (HCC)  Mood controlled with current regimen, notes some stress/anxiety with son with some mental healthy issues, will increase Cymbalta d/t fibromyalgia symptoms     Lower back pain  Not controlled - increase Cymbalta from 30 to 40 mg daily,  d/w pt that it takes 4-6 wks to get maximum benefit of med and that med has to be taken every day and to not miss doses of med, re-eval in 4-6 wks         Diagnoses and all orders for this visit:    Mixed hyperlipidemia    Hypothyroidism, unspecified type    Fibromyalgia  -     DULoxetine 40 MG CPEP; Take 1 capsule (40 mg total) by mouth daily    Chronic low back pain, unspecified back pain laterality, unspecified whether sciatica present  -     DULoxetine 40 MG CPEP; Take 1 capsule (40 mg total) by mouth daily    Depression, recurrent ([facility] [address] )      Colonoscopy never done, FIT neg 4/2020    Mammo 2017    PAP s/p hysterectomy    Dexa 9/20 - osteopenia    BW 4/21    ADDENDUM: 40 MG OF DULOXETINE NOT COVERED, 60 MG IS COVERED - RX SENT      Subjective:      Patient ID: Johan Rodriguez is a 50 y o  female  HPI Pt here for 3 mo follow up appt and BW results    BW results were d/w pt in detail: CBC/CMP/TSH/FT4 were wnl, FLP with TC up at 220 and LDL up at 151, TG and HDL wnl  Goal FLP was d/w pt in detail  Diet/exercise reviewed  She is taking her Atorvastatin but admits it is not daily as directed  She notes no SE  She notes no stroke/TIA symptoms/CP  Pt is taking their thyroid medication daily w/o any other meds and prior to eating  They deny any significant wgt changes/C/D/tremor/palp/hair loss or skin changes  Last visit in Dec pts pain related to her fibromyalgia was up and we subsequently increased her Cymbalta from 20 mg to 30 mg daily  She is here for a med/mood check  She is taking the Cymbalta daily at the increased dose w/some fatigue  She has her Fentanyl patch as previously directed as well  She notes significant increase in pain on 3rd day when the patch starts to wear down  She notes no great benefit with the increase in the Cymbalta  She con't to note significant issues with pain  She saw sleep doc who recommended medical marijuana  She notes some withdrawal symptoms as well  She notes her depression is doing pretty good  She has had some stress with her son needing some psychiatric help  Colonoscopy never done, FIT neg 4/2020    Mammo 2017    PAP s/p hysterectomy    Dexa 9/20 - osteopenia    BW 4/21      Review of Systems   Constitutional: Positive for fatigue  Negative for chills, fever and unexpected weight change  HENT: Negative for congestion and sore throat  Eyes: Negative for pain and visual disturbance  Respiratory: Negative for cough and shortness of breath  Cardiovascular: Negative for chest pain and palpitations  Gastrointestinal: Negative for abdominal pain, blood in stool, constipation, diarrhea, nausea and vomiting  Genitourinary: Negative for difficulty urinating and dysuria  Musculoskeletal: Positive for arthralgias, back pain and myalgias  Negative for neck pain  Skin: Negative for rash and wound  Neurological: Negative for dizziness, light-headedness and headaches  Hematological: Negative for adenopathy     Psychiatric/Behavioral: Negative for behavioral problems, confusion and dysphoric mood  The patient is nervous/anxious  Objective:    /72   Pulse 80   Temp (!) 96 9 °F (36 1 °C) (Temporal)   Ht 5' 4" (1 626 m)   Wt 65 7 kg (144 lb 12 8 oz)   BMI 24 85 kg/m²      Physical Exam  Vitals signs and nursing note reviewed  Constitutional:       General: She is not in acute distress  Appearance: She is well-developed  She is not ill-appearing  HENT:      Head: Normocephalic and atraumatic  Eyes:      General:         Right eye: No discharge  Left eye: No discharge  Conjunctiva/sclera: Conjunctivae normal    Neck:      Musculoskeletal: Neck supple  Trachea: No tracheal deviation  Cardiovascular:      Rate and Rhythm: Normal rate and regular rhythm  Heart sounds: Normal heart sounds  No murmur  No friction rub  Pulmonary:      Effort: Pulmonary effort is normal  No respiratory distress  Breath sounds: Normal breath sounds  No wheezing, rhonchi or rales  Abdominal:      General: There is no distension  Palpations: Abdomen is soft  Tenderness: There is no abdominal tenderness  There is no guarding or rebound  Musculoskeletal:      Right lower leg: No edema  Left lower leg: No edema  Skin:     General: Skin is warm  Coloration: Skin is not pale  Findings: No rash  Neurological:      General: No focal deficit present  Mental Status: She is alert  Mental status is at baseline  Motor: No abnormal muscle tone  Gait: Gait normal    Psychiatric:         Mood and Affect: Mood normal          Behavior: Behavior normal          Thought Content:  Thought content normal          Judgment: Judgment normal

## 2021-04-09 NOTE — ASSESSMENT & PLAN NOTE
TFT's at goal and only with some fatigue as thyroid symptom, con't current regimen for now, re-eval TFT's annually

## 2021-04-09 NOTE — TELEPHONE ENCOUNTER
Requested medication(s) are due for refill today: No  Patient has already received a courtesy refill: Yes  Other reason request has been forwarded to provider:

## 2021-04-09 NOTE — ASSESSMENT & PLAN NOTE
FLP still with elevated TC and LDL - pt admits to not taking statin daily - urged to do so and healthy diet/exercise encouraged, re-eval FLP annually

## 2021-04-09 NOTE — ASSESSMENT & PLAN NOTE
Not controlled - increase Cymbalta from 30 to 40 mg daily,  d/w pt that it takes 4-6 wks to get maximum benefit of med and that med has to be taken every day and to not miss doses of med, re-eval in 4-6 wks

## 2021-04-09 NOTE — ASSESSMENT & PLAN NOTE
Mood controlled with current regimen, notes some stress/anxiety with son with some mental healthy issues, will increase Cymbalta d/t fibromyalgia symptoms

## 2021-04-09 NOTE — ASSESSMENT & PLAN NOTE
No great benefit with increase in Cymbalta from 20 to 30 mg, notes some fatigue with increase in dose but tolerable, increase to 40 mg and re-yvonne irene 6 wks, discussed medical marijuana and advised pt to take next step and call office to establish care, once pain better will discuss weaning off the Fentanyl patch

## 2021-04-13 DIAGNOSIS — G89.4 CHRONIC PAIN SYNDROME: ICD-10-CM

## 2021-04-13 RX ORDER — FENTANYL 25 UG/H
1 PATCH TRANSDERMAL
Qty: 10 PATCH | Refills: 0 | Status: SHIPPED | OUTPATIENT
Start: 2021-04-13 | End: 2021-06-10 | Stop reason: SDUPTHER

## 2021-05-07 ENCOUNTER — TELEPHONE (OUTPATIENT)
Dept: SLEEP CENTER | Facility: CLINIC | Age: 56
End: 2021-05-07

## 2021-05-07 NOTE — TELEPHONE ENCOUNTER
Received call from Isidra Billings at pulmonary office of Dr Randolph Earing requesting copy of patients diagnostic sleep study from 2015  Copy of study faxed to [facility] at number provided [phone]

## 2021-06-10 DIAGNOSIS — G89.4 CHRONIC PAIN SYNDROME: ICD-10-CM

## 2021-06-10 RX ORDER — FENTANYL 25 UG/H
1 PATCH TRANSDERMAL
Qty: 10 PATCH | Refills: 0 | Status: SHIPPED | OUTPATIENT
Start: 2021-06-10 | End: 2021-08-06 | Stop reason: SDUPTHER

## 2021-06-28 DIAGNOSIS — Z12.11 SCREEN FOR COLON CANCER: Primary | ICD-10-CM

## 2021-08-06 DIAGNOSIS — G89.4 CHRONIC PAIN SYNDROME: ICD-10-CM

## 2021-08-06 RX ORDER — FENTANYL 25 UG/H
1 PATCH TRANSDERMAL
Qty: 10 PATCH | Refills: 0 | Status: SHIPPED | OUTPATIENT
Start: 2021-08-06 | End: 2021-10-01 | Stop reason: SDUPTHER

## 2021-10-01 DIAGNOSIS — G89.4 CHRONIC PAIN SYNDROME: ICD-10-CM

## 2021-10-01 RX ORDER — FENTANYL 25 UG/H
1 PATCH TRANSDERMAL
Qty: 10 PATCH | Refills: 0 | Status: SHIPPED | OUTPATIENT
Start: 2021-10-01 | End: 2021-11-04 | Stop reason: SDUPTHER

## 2021-11-04 DIAGNOSIS — G89.4 CHRONIC PAIN SYNDROME: ICD-10-CM

## 2021-11-04 RX ORDER — FENTANYL 25 UG/H
1 PATCH TRANSDERMAL
Qty: 10 PATCH | Refills: 0 | Status: SHIPPED | OUTPATIENT
Start: 2021-11-04

## 2023-12-27 ENCOUNTER — APPOINTMENT (RX ONLY)
Dept: URBAN - METROPOLITAN AREA CLINIC 50 | Facility: CLINIC | Age: 58
Setting detail: DERMATOLOGY
End: 2023-12-27

## 2023-12-27 DIAGNOSIS — L82.1 OTHER SEBORRHEIC KERATOSIS: ICD-10-CM

## 2023-12-27 DIAGNOSIS — L73.8 OTHER SPECIFIED FOLLICULAR DISORDERS: ICD-10-CM

## 2023-12-27 PROCEDURE — ? PRESCRIPTION MEDICATION MANAGEMENT

## 2023-12-27 PROCEDURE — 99203 OFFICE O/P NEW LOW 30 MIN: CPT

## 2023-12-27 PROCEDURE — ? FULL BODY SKIN EXAM - DECLINED

## 2023-12-27 PROCEDURE — ? COUNSELING

## 2023-12-27 ASSESSMENT — LOCATION DETAILED DESCRIPTION DERM
LOCATION DETAILED: LEFT INFERIOR MEDIAL FOREHEAD
LOCATION DETAILED: LEFT AREOLA

## 2023-12-27 ASSESSMENT — LOCATION ZONE DERM
LOCATION ZONE: FACE
LOCATION ZONE: TRUNK

## 2023-12-27 ASSESSMENT — LOCATION SIMPLE DESCRIPTION DERM
LOCATION SIMPLE: LEFT FOREHEAD
LOCATION SIMPLE: LEFT BREAST

## 2023-12-27 NOTE — PROCEDURE: MIPS QUALITY
Quality 431: Preventive Care And Screening: Unhealthy Alcohol Use - Screening: Patient identified as an unhealthy alcohol user when screened for unhealthy alcohol use using a systematic screening method and received brief counseling
Detail Level: Detailed
Quality 130: Documentation Of Current Medications In The Medical Record: Current Medications Documented
Quality 226: Preventive Care And Screening: Tobacco Use: Screening And Cessation Intervention: Patient screened for tobacco use, is a smoker AND received Cessation Counseling within measurement period or in the six months prior to the measurement period